# Patient Record
Sex: FEMALE | Race: BLACK OR AFRICAN AMERICAN | URBAN - METROPOLITAN AREA
[De-identification: names, ages, dates, MRNs, and addresses within clinical notes are randomized per-mention and may not be internally consistent; named-entity substitution may affect disease eponyms.]

---

## 2017-02-24 ENCOUNTER — HISTORICAL (OUTPATIENT)
Dept: INTERNAL MEDICINE | Facility: CLINIC | Age: 67
End: 2017-02-24

## 2017-05-12 ENCOUNTER — HISTORICAL (OUTPATIENT)
Dept: INTERNAL MEDICINE | Facility: CLINIC | Age: 67
End: 2017-05-12

## 2017-05-12 LAB
ERYTHROCYTE [SEDIMENTATION RATE] IN BLOOD: 55 MM/HR (ref 0–20)
RHEUMATOID FACT SERPL-ACNC: <10 IU/ML (ref 0–15)
T4 FREE SERPL-MCNC: 0.69 NG/DL (ref 0.6–1.15)
T4 SERPL-MCNC: 6.67 MCG/DL (ref 6.09–12.23)
TSH SERPL-ACNC: 7.97 MIU/L (ref 0.5–5)
URATE SERPL-MCNC: 5.4 MG/DL (ref 2.5–6.5)

## 2017-05-17 ENCOUNTER — HISTORICAL (OUTPATIENT)
Dept: INTERNAL MEDICINE | Facility: CLINIC | Age: 67
End: 2017-05-17

## 2017-05-31 ENCOUNTER — HISTORICAL (OUTPATIENT)
Dept: ADMINISTRATIVE | Facility: HOSPITAL | Age: 67
End: 2017-05-31

## 2017-06-08 ENCOUNTER — HISTORICAL (OUTPATIENT)
Dept: SLEEP MEDICINE | Facility: HOSPITAL | Age: 67
End: 2017-06-08

## 2017-07-19 ENCOUNTER — HISTORICAL (OUTPATIENT)
Dept: SLEEP MEDICINE | Facility: HOSPITAL | Age: 67
End: 2017-07-19

## 2017-07-28 ENCOUNTER — HISTORICAL (OUTPATIENT)
Dept: ADMINISTRATIVE | Facility: HOSPITAL | Age: 67
End: 2017-07-28

## 2017-07-28 LAB
APPEARANCE, UA: CLEAR
BACTERIA #/AREA URNS AUTO: ABNORMAL /[HPF]
BILIRUB UR QL STRIP: NEGATIVE
BUN SERPL-MCNC: 13 MG/DL (ref 7–18)
CALCIUM SERPL-MCNC: 9.6 MG/DL (ref 8.5–10.1)
CHLORIDE SERPL-SCNC: 104 MMOL/L (ref 98–107)
CHOLEST SERPL-MCNC: 185 MG/DL
CHOLEST/HDLC SERPL: 3.8 {RATIO} (ref 0–4.4)
CO2 SERPL-SCNC: 31 MMOL/L (ref 21–32)
COLOR UR: NORMAL
CREAT SERPL-MCNC: 1 MG/DL (ref 0.6–1.3)
CREAT UR-MCNC: 160 MG/DL
EST. AVERAGE GLUCOSE BLD GHB EST-MCNC: 140 MG/DL
GLUCOSE (UA): NORMAL
GLUCOSE SERPL-MCNC: 102 MG/DL (ref 74–106)
HBA1C MFR BLD: 6.5 % (ref 4.2–6.3)
HDLC SERPL-MCNC: 49 MG/DL
HGB UR QL STRIP: NEGATIVE
HYALINE CASTS #/AREA URNS LPF: ABNORMAL /[LPF]
KETONES UR QL STRIP: NEGATIVE
LDLC SERPL CALC-MCNC: 108 MG/DL (ref 0–130)
LEUKOCYTE ESTERASE UR QL STRIP: NEGATIVE
MICROALBUMIN UR-MCNC: 11.6 MG/L (ref 0–19)
MICROALBUMIN/CREAT RATIO PNL UR: 7.2 MCG/MG CR (ref 0–29)
NITRITE UR QL STRIP: NEGATIVE
PH UR STRIP: 7 [PH] (ref 4.5–8)
POTASSIUM SERPL-SCNC: 4.6 MMOL/L (ref 3.5–5.1)
PROT UR QL STRIP: NEGATIVE
RBC #/AREA URNS AUTO: ABNORMAL /[HPF]
SODIUM SERPL-SCNC: 143 MMOL/L (ref 136–145)
SP GR UR STRIP: 1.01 (ref 1–1.03)
SQUAMOUS #/AREA URNS LPF: ABNORMAL /[LPF]
TRIGL SERPL-MCNC: 140 MG/DL
UROBILINOGEN UR STRIP-ACNC: NORMAL
VLDLC SERPL CALC-MCNC: 28 MG/DL
WBC #/AREA URNS AUTO: ABNORMAL /HPF

## 2017-12-27 ENCOUNTER — HISTORICAL (OUTPATIENT)
Dept: NEPHROLOGY | Facility: CLINIC | Age: 67
End: 2017-12-27

## 2017-12-27 LAB
ALBUMIN SERPL-MCNC: 3.4 GM/DL (ref 3.4–5)
ALBUMIN/GLOB SERPL: 1 RATIO (ref 1–2)
ALP SERPL-CCNC: 110 UNIT/L (ref 45–117)
ALT SERPL-CCNC: 19 UNIT/L (ref 12–78)
AST SERPL-CCNC: 17 UNIT/L (ref 15–37)
BILIRUB SERPL-MCNC: 0.2 MG/DL (ref 0.2–1)
BILIRUBIN DIRECT+TOT PNL SERPL-MCNC: <0.1 MG/DL
BILIRUBIN DIRECT+TOT PNL SERPL-MCNC: ABNORMAL MG/DL
BUN SERPL-MCNC: 15 MG/DL (ref 7–18)
CALCIUM SERPL-MCNC: 8.8 MG/DL (ref 8.5–10.1)
CHLORIDE SERPL-SCNC: 106 MMOL/L (ref 98–107)
CHOLEST SERPL-MCNC: 169 MG/DL
CHOLEST/HDLC SERPL: 3.9 {RATIO} (ref 0–4.4)
CO2 SERPL-SCNC: 30 MMOL/L (ref 21–32)
CREAT SERPL-MCNC: 0.8 MG/DL (ref 0.6–1.3)
CREAT UR-MCNC: 90 MG/DL
EST. AVERAGE GLUCOSE BLD GHB EST-MCNC: 157 MG/DL
GLOBULIN SER-MCNC: 4.5 GM/ML (ref 2.3–3.5)
GLUCOSE SERPL-MCNC: 108 MG/DL (ref 74–106)
HBA1C MFR BLD: 7.1 % (ref 4.2–6.3)
HDLC SERPL-MCNC: 43 MG/DL
LDLC SERPL CALC-MCNC: 100 MG/DL (ref 0–130)
MICROALBUMIN UR-MCNC: 5.6 MG/L (ref 0–19)
MICROALBUMIN/CREAT RATIO PNL UR: 6.2 MCG/MG CR (ref 0–29)
POTASSIUM SERPL-SCNC: 4.3 MMOL/L (ref 3.5–5.1)
PROT SERPL-MCNC: 7.9 GM/DL (ref 6.4–8.2)
SODIUM SERPL-SCNC: 143 MMOL/L (ref 136–145)
T4 FREE SERPL-MCNC: 1.26 NG/DL (ref 0.76–1.46)
T4 SERPL-MCNC: 11.5 MCG/DL (ref 4.8–13.9)
TRIGL SERPL-MCNC: 128 MG/DL
TSH SERPL-ACNC: 0.15 MIU/L (ref 0.36–3.74)
VLDLC SERPL CALC-MCNC: 26 MG/DL

## 2018-02-01 ENCOUNTER — HISTORICAL (OUTPATIENT)
Dept: INTERNAL MEDICINE | Facility: CLINIC | Age: 68
End: 2018-02-01

## 2018-04-12 ENCOUNTER — HISTORICAL (OUTPATIENT)
Dept: ADMINISTRATIVE | Facility: HOSPITAL | Age: 68
End: 2018-04-12

## 2018-04-12 LAB
CREAT UR-MCNC: 220 MG/DL
DEPRECATED CALCIDIOL+CALCIFEROL SERPL-MC: 35.02 NG/ML (ref 30–80)
EST. AVERAGE GLUCOSE BLD GHB EST-MCNC: 160 MG/DL
HBA1C MFR BLD: 7.2 % (ref 4.2–6.3)
MICROALBUMIN UR-MCNC: 16.1 MG/L (ref 0–19)
MICROALBUMIN/CREAT RATIO PNL UR: 7.3 MCG/MG CR (ref 0–29)
VIT B12 SERPL-MCNC: 329 PG/ML (ref 193–986)

## 2018-04-19 ENCOUNTER — HISTORICAL (OUTPATIENT)
Dept: ENDOSCOPY | Facility: HOSPITAL | Age: 68
End: 2018-04-19

## 2018-06-08 ENCOUNTER — HISTORICAL (OUTPATIENT)
Dept: INTERNAL MEDICINE | Facility: CLINIC | Age: 68
End: 2018-06-08

## 2018-06-08 LAB
APPEARANCE, UA: CLEAR
BACTERIA #/AREA URNS AUTO: ABNORMAL /[HPF]
BILIRUB UR QL STRIP: NEGATIVE
BUN SERPL-MCNC: 13 MG/DL (ref 7–18)
CALCIUM SERPL-MCNC: 9 MG/DL (ref 8.5–10.1)
CHLORIDE SERPL-SCNC: 105 MMOL/L (ref 98–107)
CHOLEST SERPL-MCNC: 189 MG/DL
CHOLEST/HDLC SERPL: 4 {RATIO} (ref 0–4.4)
CO2 SERPL-SCNC: 30 MMOL/L (ref 21–32)
COLOR UR: YELLOW
CREAT SERPL-MCNC: 0.9 MG/DL (ref 0.6–1.3)
CREAT UR-MCNC: 248 MG/DL
CREAT/UREA NIT SERPL: 14
EST. AVERAGE GLUCOSE BLD GHB EST-MCNC: 157 MG/DL
GLUCOSE (UA): NORMAL
GLUCOSE SERPL-MCNC: 111 MG/DL (ref 74–106)
HBA1C MFR BLD: 7.1 % (ref 4.2–6.3)
HDLC SERPL-MCNC: 47 MG/DL
HGB UR QL STRIP: 0.06 MG/DL
HYALINE CASTS #/AREA URNS LPF: ABNORMAL /[LPF]
KETONES UR QL STRIP: NEGATIVE
LDLC SERPL CALC-MCNC: 118 MG/DL (ref 0–130)
LEUKOCYTE ESTERASE UR QL STRIP: NEGATIVE
MICROALBUMIN UR-MCNC: 25 MG/L (ref 0–19)
MICROALBUMIN/CREAT RATIO PNL UR: 10.1 MCG/MG CR (ref 0–29)
NITRITE UR QL STRIP: NEGATIVE
PH UR STRIP: 6 [PH] (ref 4.5–8)
POTASSIUM SERPL-SCNC: 4.4 MMOL/L (ref 3.5–5.1)
PROT UR QL STRIP: 10 MG/DL
RBC #/AREA URNS AUTO: ABNORMAL /[HPF]
SODIUM SERPL-SCNC: 140 MMOL/L (ref 136–145)
SP GR UR STRIP: 1.02 (ref 1–1.03)
SQUAMOUS #/AREA URNS LPF: ABNORMAL /[LPF]
TRIGL SERPL-MCNC: 122 MG/DL
UROBILINOGEN UR STRIP-ACNC: NORMAL
VLDLC SERPL CALC-MCNC: 24 MG/DL
WBC #/AREA URNS AUTO: ABNORMAL /HPF

## 2018-06-12 ENCOUNTER — HISTORICAL (OUTPATIENT)
Dept: ADMINISTRATIVE | Facility: HOSPITAL | Age: 68
End: 2018-06-12

## 2018-06-12 LAB
T4 FREE SERPL-MCNC: 1.22 NG/DL (ref 0.76–1.46)
T4 SERPL-MCNC: 11.2 MCG/DL (ref 4.8–13.9)
TSH SERPL-ACNC: 0.09 MIU/L (ref 0.36–3.74)
VIT B12 SERPL-MCNC: 293 PG/ML (ref 193–986)

## 2018-08-01 ENCOUNTER — HISTORICAL (OUTPATIENT)
Dept: INTERNAL MEDICINE | Facility: CLINIC | Age: 68
End: 2018-08-01

## 2018-08-01 LAB
ABS NEUT (OLG): 2.16 X10(3)/MCL (ref 2.1–9.2)
BASOPHILS # BLD AUTO: 0.05 X10(3)/MCL
BASOPHILS NFR BLD AUTO: 1 %
BUN SERPL-MCNC: 14 MG/DL (ref 7–18)
CALCIUM SERPL-MCNC: 8.9 MG/DL (ref 8.5–10.1)
CHLORIDE SERPL-SCNC: 107 MMOL/L (ref 98–107)
CO2 SERPL-SCNC: 30 MMOL/L (ref 21–32)
CREAT SERPL-MCNC: 0.9 MG/DL (ref 0.6–1.3)
CREAT UR-MCNC: 76 MG/DL
CREAT/UREA NIT SERPL: 16
EOSINOPHIL # BLD AUTO: 0.22 10*3/UL
EOSINOPHIL NFR BLD AUTO: 4 %
ERYTHROCYTE [DISTWIDTH] IN BLOOD BY AUTOMATED COUNT: 14.6 % (ref 11.5–14.5)
EST. AVERAGE GLUCOSE BLD GHB EST-MCNC: 151 MG/DL
GLUCOSE SERPL-MCNC: 125 MG/DL (ref 74–106)
HBA1C MFR BLD: 6.9 % (ref 4.2–6.3)
HCT VFR BLD AUTO: 36.2 % (ref 35–46)
HGB BLD-MCNC: 11.6 GM/DL (ref 12–16)
IMM GRANULOCYTES # BLD AUTO: 0.01 10*3/UL
IMM GRANULOCYTES NFR BLD AUTO: 0 %
LYMPHOCYTES # BLD AUTO: 2.99 X10(3)/MCL
LYMPHOCYTES NFR BLD AUTO: 50 % (ref 13–40)
MCH RBC QN AUTO: 27.5 PG (ref 26–34)
MCHC RBC AUTO-ENTMCNC: 32 GM/DL (ref 31–37)
MCV RBC AUTO: 85.8 FL (ref 80–100)
MICROALBUMIN UR-MCNC: <5 MG/L (ref 0–19)
MICROALBUMIN/CREAT RATIO PNL UR: <6.6 MCG/MG CR (ref 0–29)
MONOCYTES # BLD AUTO: 0.61 X10(3)/MCL
MONOCYTES NFR BLD AUTO: 10 % (ref 4–12)
NEUTROPHILS # BLD AUTO: 2.16 X10(3)/MCL
NEUTROPHILS NFR BLD AUTO: 36 X10(3)/MCL
PLATELET # BLD AUTO: 271 X10(3)/MCL (ref 130–400)
PMV BLD AUTO: 9 FL (ref 7.4–10.4)
POTASSIUM SERPL-SCNC: 4.2 MMOL/L (ref 3.5–5.1)
RBC # BLD AUTO: 4.22 X10(6)/MCL (ref 4–5.2)
SODIUM SERPL-SCNC: 142 MMOL/L (ref 136–145)
WBC # SPEC AUTO: 6 X10(3)/MCL (ref 4.5–11)

## 2018-08-03 ENCOUNTER — HISTORICAL (OUTPATIENT)
Dept: ADMINISTRATIVE | Facility: HOSPITAL | Age: 68
End: 2018-08-03

## 2018-08-22 ENCOUNTER — HISTORICAL (OUTPATIENT)
Dept: ADMINISTRATIVE | Facility: HOSPITAL | Age: 68
End: 2018-08-22

## 2018-10-16 ENCOUNTER — HISTORICAL (OUTPATIENT)
Dept: ADMINISTRATIVE | Facility: HOSPITAL | Age: 68
End: 2018-10-16

## 2018-11-05 ENCOUNTER — HISTORICAL (OUTPATIENT)
Dept: INTERNAL MEDICINE | Facility: CLINIC | Age: 68
End: 2018-11-05

## 2018-11-05 LAB
ALBUMIN SERPL-MCNC: 3.5 GM/DL (ref 3.4–5)
ALBUMIN/GLOB SERPL: 1 RATIO (ref 1–2)
ALP SERPL-CCNC: 97 UNIT/L (ref 45–117)
ALT SERPL-CCNC: 27 UNIT/L (ref 12–78)
AST SERPL-CCNC: 13 UNIT/L (ref 15–37)
BILIRUB SERPL-MCNC: 0.2 MG/DL (ref 0.2–1)
BILIRUBIN DIRECT+TOT PNL SERPL-MCNC: <0.1 MG/DL
BILIRUBIN DIRECT+TOT PNL SERPL-MCNC: ABNORMAL MG/DL
BUN SERPL-MCNC: 17 MG/DL (ref 7–18)
CALCIUM SERPL-MCNC: 9.2 MG/DL (ref 8.5–10.1)
CHLORIDE SERPL-SCNC: 104 MMOL/L (ref 98–107)
CHOLEST SERPL-MCNC: 181 MG/DL
CHOLEST/HDLC SERPL: 3.9 {RATIO} (ref 0–4.4)
CO2 SERPL-SCNC: 33 MMOL/L (ref 21–32)
CREAT SERPL-MCNC: 0.9 MG/DL (ref 0.6–1.3)
CREAT UR-MCNC: 114 MG/DL
EST. AVERAGE GLUCOSE BLD GHB EST-MCNC: 148 MG/DL
GLOBULIN SER-MCNC: 4.7 GM/ML (ref 2.3–3.5)
GLUCOSE SERPL-MCNC: 80 MG/DL (ref 74–106)
HBA1C MFR BLD: 6.8 % (ref 4.2–6.3)
HDLC SERPL-MCNC: 46 MG/DL
LDLC SERPL CALC-MCNC: 115 MG/DL (ref 0–130)
MICROALBUMIN UR-MCNC: 7.6 MG/L (ref 0–19)
MICROALBUMIN/CREAT RATIO PNL UR: 6.7 MCG/MG CR (ref 0–29)
POTASSIUM SERPL-SCNC: 4.1 MMOL/L (ref 3.5–5.1)
PROT SERPL-MCNC: 8.2 GM/DL (ref 6.4–8.2)
SODIUM SERPL-SCNC: 142 MMOL/L (ref 136–145)
T4 FREE SERPL-MCNC: 1.26 NG/DL (ref 0.76–1.46)
T4 SERPL-MCNC: 12.5 MCG/DL (ref 4.8–13.9)
TRIGL SERPL-MCNC: 100 MG/DL
TSH SERPL-ACNC: 0.04 MIU/L (ref 0.36–3.74)
VLDLC SERPL CALC-MCNC: 20 MG/DL

## 2018-12-19 ENCOUNTER — HISTORICAL (OUTPATIENT)
Dept: RADIOLOGY | Facility: HOSPITAL | Age: 68
End: 2018-12-19

## 2019-01-14 ENCOUNTER — HISTORICAL (OUTPATIENT)
Dept: CARDIOLOGY | Facility: HOSPITAL | Age: 69
End: 2019-01-14

## 2019-01-17 ENCOUNTER — HISTORICAL (OUTPATIENT)
Dept: INTERNAL MEDICINE | Facility: CLINIC | Age: 69
End: 2019-01-17

## 2019-01-17 LAB
BUN SERPL-MCNC: 12 MG/DL (ref 7–18)
CALCIUM SERPL-MCNC: 9.5 MG/DL (ref 8.5–10.1)
CHLORIDE SERPL-SCNC: 104 MMOL/L (ref 98–107)
CO2 SERPL-SCNC: 33 MMOL/L (ref 21–32)
CREAT SERPL-MCNC: 1 MG/DL (ref 0.6–1.3)
CREAT UR-MCNC: 69 MG/DL
CREAT/UREA NIT SERPL: 12
EST. AVERAGE GLUCOSE BLD GHB EST-MCNC: 146 MG/DL
GLUCOSE SERPL-MCNC: 112 MG/DL (ref 74–106)
HBA1C MFR BLD: 6.7 % (ref 4.2–6.3)
MICROALBUMIN UR-MCNC: 6.5 MG/L (ref 0–19)
MICROALBUMIN/CREAT RATIO PNL UR: 9.4 MCG/MG CR (ref 0–29)
POTASSIUM SERPL-SCNC: 4.4 MMOL/L (ref 3.5–5.1)
SODIUM SERPL-SCNC: 139 MMOL/L (ref 136–145)

## 2019-03-08 ENCOUNTER — HISTORICAL (OUTPATIENT)
Dept: ADMINISTRATIVE | Facility: HOSPITAL | Age: 69
End: 2019-03-08

## 2019-07-18 ENCOUNTER — HISTORICAL (OUTPATIENT)
Dept: INTERNAL MEDICINE | Facility: CLINIC | Age: 69
End: 2019-07-18

## 2019-07-18 LAB
BUN SERPL-MCNC: 13 MG/DL (ref 7–18)
CALCIUM SERPL-MCNC: 9.6 MG/DL (ref 8.5–10.1)
CHLORIDE SERPL-SCNC: 105 MMOL/L (ref 98–107)
CHOLEST SERPL-MCNC: 222 MG/DL
CHOLEST/HDLC SERPL: 4.1 {RATIO} (ref 0–4.4)
CO2 SERPL-SCNC: 30 MMOL/L (ref 21–32)
CREAT SERPL-MCNC: 1 MG/DL (ref 0.6–1.3)
CREAT UR-MCNC: 355 MG/DL
CREAT/UREA NIT SERPL: 13
EST. AVERAGE GLUCOSE BLD GHB EST-MCNC: 157 MG/DL
GLUCOSE SERPL-MCNC: 128 MG/DL (ref 74–106)
HBA1C MFR BLD: 7.1 % (ref 4.2–6.3)
HDLC SERPL-MCNC: 54 MG/DL
LDLC SERPL CALC-MCNC: 143 MG/DL (ref 0–130)
MICROALBUMIN UR-MCNC: 31.9 MG/L (ref 0–19)
MICROALBUMIN/CREAT RATIO PNL UR: 9 MCG/MG CR (ref 0–29)
POTASSIUM SERPL-SCNC: 3.8 MMOL/L (ref 3.5–5.1)
SODIUM SERPL-SCNC: 140 MMOL/L (ref 136–145)
TRIGL SERPL-MCNC: 127 MG/DL
VLDLC SERPL CALC-MCNC: 25 MG/DL

## 2019-07-23 ENCOUNTER — HISTORICAL (OUTPATIENT)
Dept: ADMINISTRATIVE | Facility: HOSPITAL | Age: 69
End: 2019-07-23

## 2019-07-23 LAB
T4 FREE SERPL-MCNC: 1.02 NG/DL (ref 0.76–1.46)
TSH SERPL-ACNC: 4 MIU/L (ref 0.36–3.74)

## 2019-08-07 ENCOUNTER — HISTORICAL (OUTPATIENT)
Dept: INTERNAL MEDICINE | Facility: CLINIC | Age: 69
End: 2019-08-07

## 2019-12-18 ENCOUNTER — HISTORICAL (OUTPATIENT)
Dept: ADMINISTRATIVE | Facility: HOSPITAL | Age: 69
End: 2019-12-18

## 2019-12-21 LAB — FINAL CULTURE: NORMAL

## 2020-01-22 ENCOUNTER — HISTORICAL (OUTPATIENT)
Dept: INTERNAL MEDICINE | Facility: CLINIC | Age: 70
End: 2020-01-22

## 2020-01-22 LAB
ABS NEUT (OLG): 2.8 X10(3)/MCL (ref 2.1–9.2)
ALBUMIN SERPL-MCNC: 3.9 GM/DL (ref 3.4–5)
ALBUMIN/GLOB SERPL: 0.8 RATIO (ref 1.1–2)
ALP SERPL-CCNC: 98 UNIT/L (ref 45–117)
ALT SERPL-CCNC: 20 UNIT/L (ref 12–78)
AST SERPL-CCNC: 15 UNIT/L (ref 15–37)
BASOPHILS # BLD AUTO: 0.1 X10(3)/MCL (ref 0–0.2)
BASOPHILS NFR BLD AUTO: 1 %
BILIRUB SERPL-MCNC: 0.3 MG/DL (ref 0.2–1)
BILIRUBIN DIRECT+TOT PNL SERPL-MCNC: 0.1 MG/DL (ref 0–0.2)
BILIRUBIN DIRECT+TOT PNL SERPL-MCNC: 0.2 MG/DL
BUN SERPL-MCNC: 14 MG/DL (ref 7–18)
CALCIUM SERPL-MCNC: 9.3 MG/DL (ref 8.5–10.1)
CHLORIDE SERPL-SCNC: 105 MMOL/L (ref 98–107)
CHOLEST SERPL-MCNC: 203 MG/DL
CHOLEST/HDLC SERPL: 4.1 {RATIO} (ref 0–4.4)
CO2 SERPL-SCNC: 30 MMOL/L (ref 21–32)
CREAT SERPL-MCNC: 0.8 MG/DL (ref 0.6–1.3)
EOSINOPHIL # BLD AUTO: 0.1 X10(3)/MCL (ref 0–0.9)
EOSINOPHIL NFR BLD AUTO: 2 %
ERYTHROCYTE [DISTWIDTH] IN BLOOD BY AUTOMATED COUNT: 14 % (ref 11.5–14.5)
EST. AVERAGE GLUCOSE BLD GHB EST-MCNC: 177 MG/DL
GLOBULIN SER-MCNC: 4.8 GM/ML (ref 2.3–3.5)
GLUCOSE SERPL-MCNC: 90 MG/DL (ref 74–106)
HBA1C MFR BLD: 7.8 % (ref 4.2–6.3)
HCT VFR BLD AUTO: 41.4 % (ref 35–46)
HDLC SERPL-MCNC: 50 MG/DL (ref 40–59)
HGB BLD-MCNC: 13 GM/DL (ref 12–16)
IMM GRANULOCYTES # BLD AUTO: 0.01 10*3/UL
IMM GRANULOCYTES NFR BLD AUTO: 0 %
LDLC SERPL CALC-MCNC: 126 MG/DL
LYMPHOCYTES # BLD AUTO: 2 X10(3)/MCL (ref 0.6–4.6)
LYMPHOCYTES NFR BLD AUTO: 37 %
MCH RBC QN AUTO: 27 PG (ref 26–34)
MCHC RBC AUTO-ENTMCNC: 31.4 GM/DL (ref 31–37)
MCV RBC AUTO: 85.9 FL (ref 80–100)
MONOCYTES # BLD AUTO: 0.5 X10(3)/MCL (ref 0.1–1.3)
MONOCYTES NFR BLD AUTO: 10 %
NEUTROPHILS # BLD AUTO: 2.8 X10(3)/MCL (ref 2.1–9.2)
NEUTROPHILS NFR BLD AUTO: 50 %
PLATELET # BLD AUTO: 248 X10(3)/MCL (ref 130–400)
PMV BLD AUTO: 9.2 FL (ref 7.4–10.4)
POTASSIUM SERPL-SCNC: 3.8 MMOL/L (ref 3.5–5.1)
PROT SERPL-MCNC: 8.7 GM/DL (ref 6.4–8.2)
RBC # BLD AUTO: 4.82 X10(6)/MCL (ref 4–5.2)
SODIUM SERPL-SCNC: 138 MMOL/L (ref 136–145)
T4 FREE SERPL-MCNC: 1.18 NG/DL (ref 0.76–1.46)
TRIGL SERPL-MCNC: 136 MG/DL
TSH SERPL-ACNC: 0.74 MIU/L (ref 0.36–3.74)
VLDLC SERPL CALC-MCNC: 27 MG/DL
WBC # SPEC AUTO: 5.6 X10(3)/MCL (ref 4.5–11)

## 2020-02-21 ENCOUNTER — HISTORICAL (OUTPATIENT)
Dept: INTERNAL MEDICINE | Facility: CLINIC | Age: 70
End: 2020-02-21

## 2020-02-24 ENCOUNTER — HISTORICAL (OUTPATIENT)
Dept: RESPIRATORY THERAPY | Facility: HOSPITAL | Age: 70
End: 2020-02-24

## 2020-10-19 ENCOUNTER — HISTORICAL (OUTPATIENT)
Dept: INTERNAL MEDICINE | Facility: CLINIC | Age: 70
End: 2020-10-19

## 2020-10-19 LAB
ABS NEUT (OLG): 2.28 X10(3)/MCL (ref 2.1–9.2)
ALBUMIN SERPL-MCNC: 3.9 GM/DL (ref 3.4–5)
ALBUMIN/GLOB SERPL: 0.8 RATIO (ref 1.1–2)
ALP SERPL-CCNC: 119 UNIT/L (ref 45–117)
ALT SERPL-CCNC: 20 UNIT/L (ref 12–78)
AST SERPL-CCNC: 19 UNIT/L (ref 15–37)
BASOPHILS # BLD AUTO: 0.1 X10(3)/MCL (ref 0–0.2)
BASOPHILS NFR BLD AUTO: 1 %
BILIRUB SERPL-MCNC: 0.3 MG/DL (ref 0.2–1)
BILIRUBIN DIRECT+TOT PNL SERPL-MCNC: 0.1 MG/DL (ref 0–0.2)
BILIRUBIN DIRECT+TOT PNL SERPL-MCNC: 0.2 MG/DL
BUN SERPL-MCNC: 14 MG/DL (ref 7–18)
CALCIUM SERPL-MCNC: 9.8 MG/DL (ref 8.5–10.1)
CHLORIDE SERPL-SCNC: 103 MMOL/L (ref 98–107)
CHOLEST SERPL-MCNC: 204 MG/DL
CHOLEST/HDLC SERPL: 4 {RATIO} (ref 0–4.4)
CO2 SERPL-SCNC: 30 MMOL/L (ref 21–32)
CREAT SERPL-MCNC: 1 MG/DL (ref 0.6–1.3)
CREAT UR-MCNC: 118 MG/DL
DEPRECATED CALCIDIOL+CALCIFEROL SERPL-MC: 57.4 NG/ML (ref 30–80)
EOSINOPHIL # BLD AUTO: 0.2 X10(3)/MCL (ref 0–0.9)
EOSINOPHIL NFR BLD AUTO: 3 %
ERYTHROCYTE [DISTWIDTH] IN BLOOD BY AUTOMATED COUNT: 14.2 % (ref 11.5–14.5)
EST. AVERAGE GLUCOSE BLD GHB EST-MCNC: 183 MG/DL
GLOBULIN SER-MCNC: 5.2 GM/ML (ref 2.3–3.5)
GLUCOSE SERPL-MCNC: 122 MG/DL (ref 74–106)
HBA1C MFR BLD: 8 % (ref 4.2–6.3)
HCT VFR BLD AUTO: 40.3 % (ref 35–46)
HDLC SERPL-MCNC: 51 MG/DL (ref 40–59)
HGB BLD-MCNC: 12.9 GM/DL (ref 12–16)
IMM GRANULOCYTES # BLD AUTO: 0.01 10*3/UL
IMM GRANULOCYTES NFR BLD AUTO: 0 %
LDLC SERPL CALC-MCNC: 125 MG/DL
LYMPHOCYTES # BLD AUTO: 2.5 X10(3)/MCL (ref 0.6–4.6)
LYMPHOCYTES NFR BLD AUTO: 45 %
MCH RBC QN AUTO: 26.9 PG (ref 26–34)
MCHC RBC AUTO-ENTMCNC: 32 GM/DL (ref 31–37)
MCV RBC AUTO: 84 FL (ref 80–100)
MICROALBUMIN UR-MCNC: 8.5 MG/L (ref 0–19)
MICROALBUMIN/CREAT RATIO PNL UR: 7.2 MCG/MG CR (ref 0–29)
MONOCYTES # BLD AUTO: 0.5 X10(3)/MCL (ref 0.1–1.3)
MONOCYTES NFR BLD AUTO: 10 %
NEUTROPHILS # BLD AUTO: 2.28 X10(3)/MCL (ref 2.1–9.2)
NEUTROPHILS NFR BLD AUTO: 41 %
PLATELET # BLD AUTO: 310 X10(3)/MCL (ref 130–400)
PMV BLD AUTO: 9.2 FL (ref 7.4–10.4)
POTASSIUM SERPL-SCNC: 4.2 MMOL/L (ref 3.5–5.1)
PROT SERPL-MCNC: 9.1 GM/DL (ref 6.4–8.2)
RBC # BLD AUTO: 4.8 X10(6)/MCL (ref 4–5.2)
SODIUM SERPL-SCNC: 138 MMOL/L (ref 136–145)
T4 FREE SERPL-MCNC: 1.08 NG/DL (ref 0.76–1.46)
TRIGL SERPL-MCNC: 142 MG/DL
TSH SERPL-ACNC: 2.95 MIU/L (ref 0.36–3.74)
VLDLC SERPL CALC-MCNC: 28 MG/DL
WBC # SPEC AUTO: 5.5 X10(3)/MCL (ref 4.5–11)

## 2022-04-11 ENCOUNTER — HISTORICAL (OUTPATIENT)
Dept: ADMINISTRATIVE | Facility: HOSPITAL | Age: 72
End: 2022-04-11

## 2022-04-24 VITALS
HEIGHT: 63 IN | BODY MASS INDEX: 40.23 KG/M2 | DIASTOLIC BLOOD PRESSURE: 84 MMHG | SYSTOLIC BLOOD PRESSURE: 126 MMHG | WEIGHT: 227.06 LBS

## 2022-04-30 NOTE — OP NOTE
DATE OF SURGERY:    04/19/2018    SURGEON:  Austin Nunez MD    attending physician:  Dr. Austin Nunez MD    PROCEDURE PERFORMED:  Esophagogastroduodenoscopy.    PREOPERATIVE DIAGNOSIS:  Chronic nausea.    POSTOPERATIVE DIAGNOSIS:  Gastroduodenitis.    ESTIMATED BLOOD LOSS:  Less than 2 cc.    INDICATIONS:  The patient is a 68-year-old female, who has had several months of intermittent nausea, occasional vomiting.  No hematemesis or melena.  Denies pyrosis or dysphagia.    PROCEDURE IN DETAIL:  After discussing the procedure in detail and reviewing risks and benefits, written informed consents were obtained.  She was sedated by Anesthesia and laid in the left lateral decubitus position.  A bite block was placed and the scope was introduced into the hypopharynx.  The cricopharyngeus was intubated, and the scope was advanced down the lumen of the esophagus and into the stomach.  It was retroflexed to examine the fundus and cardia and then straightened.  The pylorus was intubated and the scope was advanced down the descending duodenum.  It was withdrawn back into the antrum.  Two antral biopsies were obtained and placed in a single Hp-fast test.  Random biopsies were also obtained of the duodenal bulb, antrum, and body.  The scope was then slowly withdrawn out of the stomach and esophagus and the procedure was terminated.  She tolerated this well.  There were no complications.    FINDINGS:    1. Esophagus:  Widely patent.  Squamocolumnar junction was appropriately located.  No esophageal biopsies were obtained.    2. Stomach:  There was diffuse hyperemia throughout the stomach with scattered erosions.  No nodularity.  No bart ulcerations.    3. Duodenum:  The bulb had mild hyperemia; otherwise, the duodenal mucosa was within normal limits beyond the superior duodenal angle.    IMPRESSION:  Gastroduodenitis.      PLAN:  Followup with PCP in 2-3 weeks.  Her Hp-fast and biopsies will need follow up.  We will  discussed findings and post procedure precautions.  Colonoscopy was also performed, this is in a separate dictation.        ______________________________  MD SAHRA Ansari/JACK  DD:  04/19/2018  Time:  01:19PM  DT:  04/19/2018  Time:  01:30PM  Job #:  [id number]

## 2022-05-05 NOTE — HISTORICAL [facility] CERNER
This is a historical note converted from [employer]. Formatting and pictures may have been removed.  Please reference [employer] for original formatting and attached multimedia. Chief Complaint  Pain in right posterior back, states kidney stones and mediastinal pain and SOB with wheezing out of inhaler.  History of Present Illness  69-year-old female here today for follow-up.? Diabetes A1c is 6.7 previous A1c was 6.8 and at goal.? Patient is due for retinal exam?and foot exam today.? Hypertension at goal. Asthma pt is out of inhaler refills and states she has wheezing and SOB. She is?not on Singular. ?Pt reports flank pain due to kidney stone Xray 08/22/2018 There is a moderate amount of stool throughout the colon. Small bowel is not dilated. No nephrolithiasis is seen. Pt reports flank pain on her right side.  Review of Systems  All Systems Negative Except: See HPI  Physical Exam  Vitals & Measurements  T:?36.7? ?C (Oral)? HR:?86(Peripheral)? RR:?16? BP:?132/88?  HT:?160.0?cm? WT:?99.7?kg? BMI:?38.95?  General:? Alert and oriented, No acute distress, General health good  Eye:? Pupils are equal, round and reactive to light, Normal conjunctiva.?  HENT:? Normocephalic, Normal hearing, Oral mucosa is moist, No pharyngeal erythema.?  Neck:? Supple, Non-tender, No carotid bruit, No jugular venous distention, No lymphadenopathy, No thyromegaly.?  Respiratory:? Lungs are clear to auscultation, Respirations are non-labored, Breath sounds are equal, Symmetrical chest wall expansion.?  Cardiovascular:? Normal rate, Regular rhythm, No murmur, Good pulses equal in all extremities, Normal peripheral perfusion, No edema.?  Gastrointestinal:? Soft, Non-tender, Non-distended, Normal bowel sounds, No organomegaly.?  Musculoskeletal: Normal range of motion, Normal strength, No tenderness, No deformity, Normal gait.?  Integumentary:? Warm, Dry.?  Neurologic:? Alert, Oriented.?  Psychiatric:? Cooperative, Appropriate mood &  affect.?  ?  ?  ?  Assessment/Plan  Asthma?J45.909  Avoid/limit triggers such as pollen, dust, molds and animal dander  Avoid smoke, strong chemicals, and strong cleaning products and strong perfumes or colognes  Use rescue inhaler when needed, use nebulizer for wheezing or URI  Report upper respiratory infection in early stages and seek treatment, Treat sinusitis  Continue Singular?  Ordered:  albuterol, 2 puff(s), INH, QID, # 2 EA, 5 Refill(s), Pharmacy: [id number]  cetirizine, 10 mg = 1 tab(s), Oral, Daily, # 30 tab(s), 5 Refill(s), Pharmacy: [id number]  montelukast, 10 mg = 1 tab(s), Oral, Daily, # 30 tab(s), 6 Refill(s), Pharmacy: [id number]  ?  Diabetes?E13.9  A1C? 6.7 previous 6.8  Continue medications  Follow ADA diet, avoid soda, simple sweets, and limit rice, breads and starches  Maintain healthy weight, exercise 4-5 times a week for 30?minutes  Foot exam today  Eye exam today  RTC?4?months? w/ labs  Ordered:  Internal Referral to [facility], EYE exam DM, *Est. 03/08/19 3:00:00 CST, Future Visit?, Diabetes  ?  HTN - Hypertension?I10  At goal  Low sodium diet (Dash Diet)  Continue Medications as prescribed  Monitor Blood daily, report any consistent numbers greater than 140/90  Maintain healthy weight  ?  Insomnia?G47.00  Limit daytime naps to 30 minutes  Establish a regular relaxing bedtime routine  Avoid Caffeine after at 2pm?  Avoid use of blue light devices at bedtime, limit TV before bedtime  Exercise to promote good?quality of sleep?  Avoid foods late that can disrupt sleep  Avoid Nicotine before bedtime  Amitriptyline ?at?night  Ordered:  amitriptyline, 50 mg = 1 tab(s), Oral, Once a day (at bedtime), # 30 tab(s), 5 Refill(s), Pharmacy: [id number]  ?  Kidney stone?N20.0  X-ray 08/22/2018 No kidney stone seen  X-ray today  Drink 1/2 body weight in water daily  Ordered:  XR Abdomen Flat and Erect, Routine, *Est. 03/08/19 3:00:00 CST, Other  (please specify), kidney stone, None, Ambulatory, Rad Type, Order for future visit, Kidney stone, Not Scheduled, *Est. 03/08/19 3:00:00 CST  ?  Need for tetanus booster?Z23  ?Tetanus shot today  ?   Problem List/Past Medical History  Ongoing  HTN - Hypertension  Hypothyroidism  Obesity  Historical  Diabetes  Kidney stone  Procedure/Surgical History  Biopsy Gastrointestinal (04/19/2018)  Colonoscopy (04/19/2018)  Esophagogastroduodenoscopy (04/19/2018)  TOTAL THYROIDECTOMY (2014)  HYSTERECTOMY (1991)  UMBILICAL HERNIA REPAIR (1986)  CSECTION (1977)  CSECTION (1971)   Medications  APAP/butalbital/caffeine 325 mg-50 mg-40 mg oral tablet, 1 tab(s), Oral, q4hr, PRN  baclofen 10 mg oral tablet, 5 mg= 0.5 tab(s), Oral, q8hr  cetirizine 10 mg oral tablet, See Instructions, 5 refills  KETOCONAZOLE 2 % CREA  levothyroxine 100 mcg (0.1 mg) oral tablet, 100 mcg= 1 tab(s), Oral, Daily, 2 refills  LEVOTHYROXINE SODIUM 112 MCG TABS, 112 mcg= 1 tab(s), Oral, Daily  magnesium aspartate 1230 mg (122 mg magnesium) oral powder for reconstitution, 1 packet(s), Oral, Daily  METRONIDAZOLE 500 MG TABS, 500 mg= 1 tab(s), Oral, BID  Protonix 40 mg ORAL enteric coated tablet, 40 mg= 1 tab(s), Oral, Daily, 1 refills  Terocin topical lotion, 1 sodnra, TOP, BID, PRN  VENTOLIN  MCG/ACT AERS  Vitamin D3  Allergies  Accupril  Bactrim  Social History  Alcohol - Denies Alcohol Use, 01/28/2016  Current, 1-2 times per year, 08/12/2018  Never, 01/28/2016  Employment/School  Unemployed, Work/School description: DISABLE., 01/28/2016  Exercise  Home/Environment  Lives with Alone. Feels unsafe at home: No. Safe place to go: Yes. Family/Friends available for support: Yes., 01/28/2016  Nutrition/Health  Type of diet: nausea when thinking of food for months.Just nibbles throughtout the day.. Regular, Wants to lose weight: Yes. Eating disorders: craving sour foods and chocolate for months. . Sleeping concerns: No. Feels highly stressed: No.,  2016  Other  Sexual  Substance Abuse - Denies Substance Abuse, 2016  Current, Marijuana, 1-2 times per month, 2018  Tobacco - Denies Tobacco Use, 2016  Never smoker, N/A, 2018  Never smoker, N/A, 2018  Never smoker, N/A, Household tobacco concerns: No., 2018  Family History  Cardiac arrest.: Mother.  Congestive heart disease.: Father.  : Mother and Father.  Heart disease: Father.  Hyperthyroidism.: Negative: Sister.  Hypothyroidism.: Negative: Sister.  Health Maintenance  Health Maintenance  ???Pending?(in the next year)  ??? ??OverDue  ??? ? ? ?Diabetes Maintenance-Eye Exam due??18??and every 1??year(s)  ??? ? ? ?Alcohol Misuse Screening due??18??and every 1??year(s)  ??? ? ? ?Smoking Cessation due??10/24/18??and every 1??year(s)  ??? ? ? ?Diabetes Maintenance-Foot Exam due??01/15/19??and every 1??year(s)  ??? ? ? ?Diabetes Maintenance-Medication Prescribed due??01/15/19??and every 1??year(s)  ??? ??Due?  ??? ? ? ?ADL Screening due??19??and every 1??year(s)  ??? ? ? ?Breast Cancer Screening ([facility]) due??19??and every?  ??? ? ? ?Cognitive Screening due??19??and every 1??year(s)  ??? ? ? ?Coronary Artery Disease Maintenance-Antiplatelet Agent Prescribed due??19??and every?  ??? ? ? ?Coronary Artery Disease Maintenance-Lipid Lowering Therapy due??19??and every?  ??? ? ? ?Functional Assessment due??19??and every 1??year(s)  ??? ? ? ?Hypertension Management-Education due??19??and every 1??year(s)  ??? ? ? ?Pneumococcal Vaccine due??19??Variable frequency  ??? ? ? ?Tetanus Vaccine due??19??and every 10??year(s)  ??? ? ? ?Zoster Vaccine due??19??and every 100??year(s)  ??? ??Due In Future?  ??? ? ? ?Advance Directive not due until??19??and every 1??year(s)  ??? ? ? ?Diabetes Maintenance-Fasting Lipid Profile not due until??19??and every 1??year(s)  ??? ? ? ?Hypertension  Management-Blood Pressure not due until??11/07/19??and every 1??year(s)  ??? ? ? ?Geriatric Depression Screening not due until??11/07/19??and every 1??year(s)  ??? ? ? ?Diabetes Maintenance-Urine Dipstick not due until??12/14/19??and every 1??year(s)  ??? ? ? ?Aspirin Therapy for CVD Prevention not due until??12/14/19??and every 1??year(s)  ??? ? ? ?Obesity Screening not due until??12/27/19??and every 1??year(s)  ??? ? ? ?Fall Risk Assessment not due until??12/27/19??and every 1??year(s)  ??? ? ? ?Diabetes Maintenance-HgbA1c not due until??01/17/20??and every 1??year(s)  ??? ? ? ?Hypertension Management-BMP not due until??01/17/20??and every 1??year(s)  ??? ? ? ?Diabetes Maintenance-Microalbumin not due until??01/17/20??and every 1??year(s)  ??? ? ? ?Diabetes Maintenance-Serum Creatinine not due until??01/17/20??and every 1??year(s)  ??? ? ? ?Bone Density Screening not due until??02/01/20??and every 2??year(s)  ???Satisfied?(in the past 1 year)  ??? ??Satisfied?  ??? ? ? ?Advance Directive on??08/22/18.??Satisfied by Christine Herbert LPN  ??? ? ? ?Aspirin Therapy for CVD Prevention on??12/14/18.??Satisfied by Elsy Quevedo RN  ??? ? ? ?Blood Pressure Screening on??12/27/18.??Satisfied by Padmini Lopes RN  ??? ? ? ?Body Mass Index Check on??12/27/18.??Satisfied by Lianne Bauman RN  ??? ? ? ?Colorectal Screening on??04/19/18.  ??? ? ? ?Colorectal Screening ([facility]) on??04/19/18.  ??? ? ? ?Coronary Artery Disease Maintenance-Antiplatelet Agent Prescribed on??12/14/18.??Satisfied by Jameson Spears MD  ??? ? ? ?Depression Screening on??11/07/18.??Satisfied by Gini Scott LPN.  ??? ? ? ?Diabetes Maintenance-HgbA1c on??01/17/19.??Satisfied by Yuli Ceron  ??? ? ? ?Diabetes Maintenance-Serum Creatinine on??01/17/19.??Satisfied by Araceli Jaimes  ??? ? ? ?Diabetes Maintenance-Microalbumin on??01/17/19.??Satisfied by Araceli Jaimes  ??? ? ? ?Diabetes Maintenance-Urine Dipstick  on??12/14/18.??Satisfied by Jess Madden  ??? ? ? ?Diabetes Maintenance-Fasting Lipid Profile on??11/05/18.??Satisfied by Yuli Ceron  ??? ? ? ?Diabetes Screening on??01/17/19.??Satisfied by Araceli Jaimes  ??? ? ? ?Fall Risk Assessment on??12/27/18.??Satisfied by Padmini Lopes RN  ??? ? ? ?Geriatric Depression Screening on??11/07/18.??Satisfied by Gini Scott LPN  ??? ? ? ?Hypertension Management-BMP on??01/17/19.??Satisfied by Araceli Jaimes  ??? ? ? ?Hypertension Management-Blood Pressure on??12/27/18.??Satisfied by Padmini Lopes RN  ??? ? ? ?Influenza Vaccine on??11/07/18.??Satisfied by Gini Scott LPN  ??? ? ? ?Lipid Screening on??11/05/18.??Satisfied by Yuli Ceron  ??? ? ? ?Obesity Screening on??12/27/18.??Satisfied by Lianne Bauman RN  ?  ?  Diagnostic Results  (08/22/2018 11:34 CDT XR Abdomen KUB 1 View)  ?? ? ??  * Final Report *  ?  Reason For Exam  flank pain hx kidney stone;Other (please specify)  ?  Radiology Report  Abdomen one view  ?  Clinical history is flank pain  ?  This is compared to a previous study from 8/12/2018  ?  There is a moderate amount of stool throughout the colon. Small bowel  is not dilated. No nephrolithiasis is seen.  ?  IMPRESSION: Study showing a moderate amount of stool throughout the  colon.  ?  ?  Signature Line  Electronically Signed By: Kulwinder Mendez MD  Date/Time Signed: 08/22/2018 12:12  ?  ?  This document has an image  ?  Result type:???????  XR Abdomen KUB 1 View  Result date:???????  August 22, 2018 11:34 CDT  Result status:???????  Auth (Verified)  Result title:???????  XR Abdomen KUB 1 View  Performed by:???????  Kulwinder Mendez MD on August 22, 2018 12:11 CDT  Verified by:???????  Kulwinder Mendez MD on August 22, 2018 12:12 CDT  Encounter info:???????  [id number], [facility], Clinic Visit, 8/22/2018 - 8/22/2018  ? [1]     [1]?XR Abdomen KUB 1 View; Kulwinder Mendez MD 08/22/2018 11:34 CDT

## 2022-05-05 NOTE — HISTORICAL [facility] CERNER
This is a historical note converted from [employer]. Formatting and pictures may have been removed.  Please reference [employer] for original formatting and attached multimedia. Chief Complaint  ED F/U abdominal pain  History of Present Illness  68 year old AAF here today as ED F/U for gastritis and upper GI pain with nausea. Pt was recently treated for H pylori. She remains with upper GI pain and nausea. EGD showed gastritis and Duodenitis. Pt reports her symptoms have never improved. She was seen in the ED and started on Zantac, she stopped Protonix. Pt also reports flank pain, She has hx of kidney stones.  Review of Systems  All Systems Negative Except: See HPI  Physical Exam  Vitals & Measurements  T:?36.8? ?C (Oral)? HR:?73(Peripheral)? RR:?18? BP:?130/81?  HT:?98.4?cm? HT:?98.4?cm? WT:?161?kg? WT:?161?kg? BMI:?166.28?  General:? Alert and oriented, No acute distress, General health good  Eye:? Pupils are equal, round and reactive to light, Normal conjunctiva.?  HENT:? Normocephalic, Normal hearing, Oral mucosa is moist, No pharyngeal erythema.?  Neck:? Supple, Non-tender, No carotid bruit, No jugular venous distention, No lymphadenopathy, No thyromegaly.?  Respiratory:? Lungs are clear to auscultation, Respirations are non-labored, Breath sounds are equal, Symmetrical chest wall expansion.?  Cardiovascular:? Normal rate, Regular rhythm, No murmur, Good pulses equal in all extremities, Normal peripheral perfusion, No edema.?  Gastrointestinal:? Soft, Non-tender, Non-distended, Normal bowel sounds, No organomegaly.?  Musculoskeletal: Normal range of motion, Normal strength, No tenderness, No deformity, Normal gait.?  Integumentary:? Warm, Dry.?  Neurologic:? Alert, Oriented.?  Psychiatric:? Cooperative, Appropriate mood & affect.?  ?  ?  ?  Assessment/Plan  Gastritis and duodenitis  Continue Zantac and Protonix  Avoid greasy, fried and high acidic foods  RTC 1 week with food diary  Ordered:  Clinic Follow up, *Est.  08/29/18 3:00:00 CDT, 1 week with food log, Order for future visit, Gastritis and duodenitis, [facility]  ?  History of kidney stones  KUB xray today  Ordered:  XR Abdomen KUB 1 View, Routine, *Est. 08/22/18 3:00:00 CDT, Other (please specify), flank pain hx kidney stone, None, Ambulatory, Rad Type, Order for future visit, Right flank pain  History of kidney stones, Not Scheduled, *Est. 08/22/18 3:00:00 CDT  ?  Right flank pain  ?KUB x-ray today  Ordered:  XR Abdomen KUB 1 View, Routine, *Est. 08/22/18 3:00:00 CDT, Other (please specify), flank pain hx kidney stone, None, Ambulatory, Rad Type, Order for future visit, Right flank pain  History of kidney stones, Not Scheduled, *Est. 08/22/18 3:00:00 CDT  ?  Thrush of mouth and esophagus  ?Diflucan x 3 days  Ordered:  fluconazole, 150 mg = 1 tab(s), Oral, Daily, # 3 tab(s), 0 Refill(s), Pharmacy: [id number]  ?  Visit for screening mammogram  ?  Orders:  Holter Monitor 24/48 Hour Application/Removal, *Est. 08/22/18 13:00:00 CDT, *Est. Stop date 08/22/18 13:00:00 CDT, Order for future visit, Palpitation, Not Required, [facility]   Problem List/Past Medical History  Ongoing  HTN - Hypertension  Hypothyroidism  Obesity  Historical  Diabetes  Kidney stone  Procedure/Surgical History  Biopsy Gastrointestinal (04/19/2018)  Colonoscopy (04/19/2018)  Colonoscopy, flexible; with ablation of tumor(s), polyp(s), or other lesion(s) (includes pre- and post-dilation and guide wire passage, when performed) (04/19/2018)  Destruction of Rectum, Via Natural or Artificial Opening Endoscopic (04/19/2018)  Esophagogastroduodenoscopy (04/19/2018)  Esophagogastroduodenoscopy, flexible, transoral; with biopsy, single or multiple (04/19/2018)  Excision of Duodenum, Via Natural or Artificial Opening Endoscopic, Diagnostic (04/19/2018)  Excision of Stomach, Pylorus, Via Natural or Artificial Opening Endoscopic, Diagnostic (04/19/2018)  TOTAL THYROIDECTOMY  ()  HYSTERECTOMY ()  UMBILICAL HERNIA REPAIR ()  CSECTION ()  CSECTION ()   Medications  amLODIPine 10 mg oral tablet, 10 mg= 1 tab(s), Oral, Daily, 11 refills  baclofen 10 mg oral tablet, 5 mg= 0.5 tab(s), Oral, q8hr  cetirizine 10 mg oral tablet, See Instructions, 5 refills  CoQ10, 300 mg, Oral, Daily  levothyroxine 88 mcg (0.088 mg) oral tablet, 88 mcg= 1 tab(s), Oral, Daily  magnesium aspartate 1230 mg (122 mg magnesium) oral powder for reconstitution, 1 packet(s), Oral, Daily  Omega-3 oral capsule, 1, Oral, Daily  ONDANSETRON HCL 8 MG TABS, 8 mg= 1 tab(s), Oral, TID  Protonix 40 mg ORAL enteric coated tablet, 40 mg= 1 tab(s), Oral, Daily, 1 refills,? ?Not taking  raNITIdine 300 mg oral capsule, 300 mg= 1 cap(s), Oral, Daily  selenium, 100 mcg, Oral, Daily  VENTOLIN  MCG/ACT AERS  Vitamin D3  zinc (as acetate) 50 mg oral capsule, 50 mg= 1 cap(s), Oral, TID  Allergies  Accupril  Bactrim  Social History  Alcohol - Denies Alcohol Use, 2016  Current, 1-2 times per year, 2018  Never, 2016  Employment/School  Unemployed, Work/School description: DISABLE., 2016  Exercise  Home/Environment  Lives with Alone. Feels unsafe at home: No. Safe place to go: Yes. Family/Friends available for support: Yes., 2016  Nutrition/Health  Type of diet: nausea when thinking of food for months.Just nibbles throughtout the day.. Regular, Wants to lose weight: Yes. Eating disorders: craving sour foods and chocolate for months. . Sleeping concerns: No. Feels highly stressed: No., 2016  Other  Sexual  Substance Abuse - Denies Substance Abuse, 2016  Current, Marijuana, 1-2 times per month, 2018  Tobacco - Denies Tobacco Use, 2016  Former smoker Use:. Cigarettes Type:., 2018  Family History  Cardiac arrest.: Mother.  Congestive heart disease.: Father.  : Mother and Father.  Heart disease: Father.  Hyperthyroidism.: Negative: Sister.  Hypothyroidism.:  Negative: Sister.  Health Maintenance  Health Maintenance  ???Pending?(in the next year)  ??? ??OverDue  ??? ? ? ?Diabetes Maintenance-Eye Exam due??07/06/18??and every 1??year(s)  ??? ??Due?  ??? ? ? ?Alcohol Misuse Screening due??07/28/18??and every 1??year(s)  ??? ? ? ?Aspirin Therapy for CVD Prevention due??08/22/18??and every 1??year(s)  ??? ? ? ?Breast Cancer Screening ([facility]) due??08/22/18??and every?  ??? ? ? ?Functional Assessment due??08/22/18??and every 1??year(s)  ??? ? ? ?Hypertension Management-Education due??08/22/18??and every 1??year(s)  ??? ? ? ?Pneumococcal Vaccine due??08/22/18??and every 100??year(s)  ??? ? ? ?Pneumococcal Vaccine due??08/22/18??and every?  ??? ? ? ?Tetanus Vaccine due??08/22/18??and every 10??year(s)  ??? ? ? ?Zoster Vaccine due??08/22/18??and every 100??year(s)  ??? ??Due In Future?  ??? ? ? ?Smoking Cessation not due until??10/24/18??and every 1??year(s)  ??? ? ? ?Diabetes Maintenance-Foot Exam not due until??01/15/19??and every 1??year(s)  ??? ? ? ?Diabetes Maintenance-Medication Prescribed not due until??01/15/19??and every 1??year(s)  ??? ? ? ?Diabetes Maintenance-Fasting Lipid Profile not due until??06/08/19??and every 1??year(s)  ??? ? ? ?Diabetes Maintenance-HgbA1c not due until??08/01/19??and every 1??year(s)  ??? ? ? ?Diabetes Maintenance-Microalbumin not due until??08/01/19??and every 1??year(s)  ??? ? ? ?Hypertension Management-Blood Pressure not due until??08/03/19??and every 1??year(s)  ??? ? ? ?Hypertension Management-BMP not due until??08/12/19??and every 1??year(s)  ??? ? ? ?Diabetes Maintenance-Serum Creatinine not due until??08/12/19??and every 1??year(s)  ??? ? ? ?Diabetes Maintenance-Urine Dipstick not due until??08/12/19??and every 1??year(s)  ???Satisfied?(in the past 1 year)  ??? ??Satisfied?  ??? ? ? ?Blood Pressure Screening on??08/22/18.??Satisfied by Day Christine ANTONIO  ??? ? ? ?Body Mass Index Check on??08/22/18.??Satisfied by Day MARISELA,  Christine S.  ??? ? ? ?Bone Density Screening on??02/01/18.??Satisfied by Scarlett Ocampo.  ??? ? ? ?Colorectal Screening on??04/19/18.  ??? ? ? ?Colorectal Screening ([facility]) on??04/19/18.  ??? ? ? ?Depression Screening on??08/22/18.??Satisfied by Day MARISELA, Christine S.  ??? ? ? ?Diabetes Maintenance-Eye Exam on??10/27/17.??Satisfied by Kimberly Caldera LPN  ??? ? ? ?Diabetes Maintenance-Fasting Lipid Profile on??06/08/18.??Satisfied by Araceli Jaimes  ??? ? ? ?Diabetes Maintenance-HgbA1c on??11/23/18.??Satisfied by Ale Thomas NP  ??? ? ? ?Diabetes Maintenance-Microalbumin on??08/01/18.??Satisfied by Jess Madden  ??? ? ? ?Diabetes Maintenance-Serum Creatinine on??08/12/18.??Satisfied by Alma Ac  ??? ? ? ?Diabetes Maintenance-Urine Dipstick on??08/12/18.??Satisfied by Simi Minor  ??? ? ? ?Diabetes Screening on??08/12/18.??Satisfied by Alma Ac  ??? ? ? ?Fall Risk Assessment on??08/22/18.??Satisfied by Day MARISELA, Christine S.  ??? ? ? ?Hypertension Management-Blood Pressure on??08/22/18.??Satisfied by Day MARISELA, Christine S.  ??? ? ? ?Lipid Screening on??11/23/18.??Satisfied by Ale Thomas NP  ??? ? ? ?Obesity Screening on??08/22/18.??Satisfied by Day MARISELA, Christine S.  ??? ? ? ?Smoking Cessation on??10/24/17.??Satisfied by Padilla Pichardo RN  ??? ? ? ?Smoking Cessation (Coronary Artery Disease) on??10/24/17.??Satisfied by [place] RN, Domini Ivelisse  ??? ? ? ?Smoking Cessation (Diabetes) on??10/24/17.??Satisfied by Marino POE, Padilla Oviedo  ??? ??Refused?  ??? ? ? ?Breast Cancer Screening (1 yr) on??01/15/18.??Recorded for William CAMARILLO, Ale Langford??Reason: Patient Refuses  ?  ?

## 2022-05-05 NOTE — HISTORICAL [facility] CERNER
This is a historical note converted from [employer]. Formatting and pictures may have been removed.  Please reference [employer] for original formatting and attached multimedia. Chief Complaint  follow up, stomach problems  History of Present Illness  68 year old female here today for F/U. DM A1C? 6.9 at goal. HTN at goal. Pt reports bilateral knee pain with pressure behind her knees and falling at times. Pain is constant and burning not releived with?OTC meds. Pt also reports upper abd pain not improved with H pylori treatment. She has constant nausea. ?Pt reports palpitations daily and feeling her heart beat in her throat.  Review of Systems  All Systems Negative Except: See HPI  Physical Exam  Vitals & Measurements  T:?36.8? ?C (Oral)? HR:?66(Peripheral)? RR:?20? BP:?126/81?  HT:?154?cm? HT:?154?cm? WT:?99.6?kg? WT:?99.6?kg? BMI:?42?  General:? Alert and oriented, No acute distress, General health good  Eye:? Pupils are equal, round and reactive to light, Normal conjunctiva.?  HENT:? Normocephalic, Normal hearing, Oral mucosa is moist, No pharyngeal erythema.?  Neck:? Supple, Non-tender, No carotid bruit, No jugular venous distention, No lymphadenopathy, No thyromegaly.?  Respiratory:? Lungs are clear to auscultation, Respirations are non-labored, Breath sounds are equal, Symmetrical chest wall expansion.?  Cardiovascular:? Normal rate, Regular rhythm, No murmur, Good pulses equal in all extremities, Normal peripheral perfusion, No edema.?  Gastrointestinal:? Soft, Non-tender, Non-distended, Normal bowel sounds, No organomegaly.?  Musculoskeletal: Normal range of motion, Normal strength, No tenderness, No deformity, Normal gait.?  Integumentary:? Warm, Dry.?  Neurologic:? Alert, Oriented.?  Psychiatric:? Cooperative, Appropriate mood & affect.?  ?  ?  ?  Assessment/Plan  Bilateral knee pain  refer to ortho  X-Ray today  Ordered:  Internal Referral to Orthopedics, Bilateral knee pain falls, *Est. 09/02/18 3:00:00 CDT,  Future Visit?, Bilateral knee pain  XR Knee Left 3 Views, Routine, *Est. 08/03/18 3:00:00 CDT, Pain, bilteral knee pain falls, None, Ambulatory, Rad Type, Order for future visit, Bilateral knee pain, Not Scheduled, *Est. 08/03/18 3:00:00 CDT  XR Knee Right 3 Views, Routine, *Est. 08/03/18 3:00:00 CDT, Pain, bilteral knee pain falls, None, Ambulatory, Rad Type, Order for future visit, Bilateral knee pain, Not Scheduled, *Est. 08/03/18 3:00:00 CDT  ?  Diabetes  A1C? 6.9 at goal  Continue medications  On ACE, Statin according to guidelines  Follow ADA diet, avoid soda, simple sweets, and limit rice, breads and starches  Maintain healthy weight, exercise 4-5 times a week for 30?minutes  RTC?4 months?lab s  Ordered:  Clinic Follow up, *Est. 11/23/18 3:00:00 CST, 4 month F/U with labs, Order for future visit, Diabetes  HTN - Hypertension, [facility]  Comprehensive Metabolic Panel, Routine collect, *Est. 11/23/18 3:00:00 CST, Blood, Order for future visit, *Est. Stop date 11/23/18 3:00:00 CST, Lab Collect, Diabetes, 08/03/18 8:07:00 CDT  Hemoglobin [facility], Routine collect, *Est. 11/23/18 3:00:00 CST, Blood, Order for future visit, *Est. Stop date 11/23/18 3:00:00 CST, Lab Collect, Diabetes, 08/03/18 8:07:00 CDT  Lipid Panel, Routine collect, *Est. 11/23/18 3:00:00 CST, Blood, Order for future visit, *Est. Stop date 11/23/18 3:00:00 CST, Lab Collect, Diabetes, 08/03/18 8:07:00 CDT  Microalbum/Creatinine Ratio Urine (Microalb/Creat), Routine collect, Urine, Order for future visit, *Est. 11/23/18 3:00:00 CST, *Est. Stop date 11/23/18 3:00:00 CST, Nurse collect, Diabetes  ?  Gastritis  Avoid spicy, acidic, fried foods and alcohol  Eat 2-3 hours before going to bed  Avoid tight clothing, chew food thoroughly  Reduce Caffeine intake, avoid soda  Protonix 40mg ?for 30 days  Stop tobacco use  Ordered:  pantoprazole, 40 mg = 1 tab(s), Oral, Daily, # 30 tab(s), 1 Refill(s), Pharmacy: [place] [id number] [id number]  ?  HTN -  Hypertension  At goal  Low sodium diet (Dash Diet)  Continue Medications as prescribed  Monitor Blood daily, report any consistent numbers greater than 140/90  Maintain healthy weight  Ordered:  Clinic Follow up, *Est. 11/23/18 3:00:00 CST, 4 month F/U with labs, Order for future visit, Diabetes  HTN - Hypertension, [facility]  ?  Hypothyroidism  Lab with next visit  Ordered:  Free T4, Routine collect, *Est. 11/23/18 3:00:00 CST, Blood, Order for future visit, *Est. Stop date 11/23/18 3:00:00 CST, Lab Collect, Hypothyroidism, 08/03/18 8:07:00 CDT  T4, Routine collect, *Est. 11/23/18 3:00:00 CST, Blood, Order for future visit, *Est. Stop date 11/23/18 3:00:00 CST, Lab Collect, Hypothyroidism, 08/03/18 8:07:00 CDT  Thyroid Stimulating Hormone, Routine collect, *Est. 11/23/18 3:00:00 CST, Blood, Order for future visit, *Est. Stop date 11/23/18 3:00:00 CST, Lab Collect, Hypothyroidism, 08/03/18 8:07:00 CDT  ?  Palpitation  Ordered:  Holter Monitor 24/48 Hour Application/Removal, *Est. 08/10/18 3:00:00 CDT, *Est. Stop date 08/10/18 3:00:00 CDT, Order for future visit, Palpitation, [facility]  ?   Problem List/Past Medical History  Ongoing  HTN - Hypertension  Hypothyroidism  Obesity  Historical  Diabetes  Kidney stone  Procedure/Surgical History  Biopsy Gastrointestinal (04/19/2018)  Colonoscopy (04/19/2018)  Colonoscopy, flexible; with ablation of tumor(s), polyp(s), or other lesion(s) (includes pre- and post-dilation and guide wire passage, when performed) (04/19/2018)  Destruction of Rectum, Via Natural or Artificial Opening Endoscopic (04/19/2018)  Esophagogastroduodenoscopy (04/19/2018)  Esophagogastroduodenoscopy, flexible, transoral; with biopsy, single or multiple (04/19/2018)  Excision of Duodenum, Via Natural or Artificial Opening Endoscopic, Diagnostic (04/19/2018)  Excision of Stomach, Pylorus, Via Natural or Artificial Opening Endoscopic, Diagnostic (04/19/2018)  TOTAL THYROIDECTOMY  ()  HYSTERECTOMY ()  UMBILICAL HERNIA REPAIR ()  CSECTION ()  CSECTION ()   Medications  amLODIPine 10 mg oral tablet, 10 mg= 1 tab(s), Oral, Daily, 11 refills  B Complex Cap, Oral, Daily  cetirizine 10 mg oral tablet, See Instructions, 5 refills  CoQ10, 300 mg, Oral, Daily  cyclobenzaprine 10 mg oral tablet, 10 mg= 1 tab(s), Oral, TID, PRN, 1 refills  levothyroxine 112 mcg (0.112 mg) oral tablet, 112 mcg= 1 tab(s), Oral, Daily, 11 refills  magnesium aspartate 1230 mg (122 mg magnesium) oral powder for reconstitution, 1 packet(s), Oral, Daily  Omega-3 oral capsule, 1, Oral, Daily  selenium, 100 mcg, Oral, Daily  VENTOLIN  MCG/ACT AERS  Vitamin D3  zinc (as acetate) 50 mg oral capsule, 50 mg= 1 cap(s), Oral, TID  Allergies  Accupril  Bactrim  Social History  Alcohol - Denies Alcohol Use, 2016  Never, 2016  Employment/School  Unemployed, Work/School description: DISABLE., 2016  Exercise  Home/Environment  Lives with Alone. Feels unsafe at home: No. Safe place to go: Yes. Family/Friends available for support: Yes., 2016  Nutrition/Health  Type of diet: nausea when thinking of food for months.Just nibbles throughtout the day.. Regular, Wants to lose weight: Yes. Eating disorders: craving sour foods and chocolate for months. . Sleeping concerns: No. Feels highly stressed: No., 2016  Other  Sexual  Substance Abuse - Denies Substance Abuse, 2016  Never, 2016  Tobacco - Denies Tobacco Use, 2016  Never smoker Use:., 2018  Family History  Cardiac arrest.: Mother.  Congestive heart disease.: Father.  : Mother and Father.  Heart disease: Father.  Hyperthyroidism.: Negative: Sister.  Hypothyroidism.: Negative: Sister.  Health Maintenance  Health Maintenance  ???Pending?(in the next year)  ??? ??Due?  ??? ? ? ?Alcohol Misuse Screening due??18??and every 1??year(s)  ??? ? ? ?Aspirin Therapy for CVD Prevention due??18??and every  1??year(s)  ??? ? ? ?Breast Cancer Screening ([facility]) due??08/03/18??and every?  ??? ? ? ?Diabetes Maintenance-Eye Exam due??08/03/18??and every 1??year(s)  ??? ? ? ?Functional Assessment due??08/03/18??and every 1??year(s)  ??? ? ? ?Hypertension Management-Education due??08/03/18??and every 1??year(s)  ??? ? ? ?Pneumococcal Vaccine due??08/03/18??and every 100??year(s)  ??? ? ? ?Pneumococcal Vaccine due??08/03/18??and every?  ??? ? ? ?Tetanus Vaccine due??08/03/18??and every 10??year(s)  ??? ? ? ?Zoster Vaccine due??08/03/18??and every 100??year(s)  ??? ??Due In Future?  ??? ? ? ?Smoking Cessation not due until??10/24/18??and every 1??year(s)  ??? ? ? ?Diabetes Maintenance-Foot Exam not due until??01/15/19??and every 1??year(s)  ??? ? ? ?Diabetes Maintenance-Medication Prescribed not due until??01/15/19??and every 1??year(s)  ??? ? ? ?Diabetes Maintenance-Fasting Lipid Profile not due until??06/08/19??and every 1??year(s)  ??? ? ? ?Diabetes Maintenance-Urine Dipstick not due until??06/08/19??and every 1??year(s)  ??? ? ? ?Hypertension Management-Blood Pressure not due until??06/12/19??and every 1??year(s)  ??? ? ? ?Fall Risk Assessment not due until??06/12/19??and every 1??year(s)  ??? ? ? ?Obesity Screening not due until??06/12/19??and every 1??year(s)  ??? ? ? ?Diabetes Maintenance-HgbA1c not due until??08/01/19??and every 1??year(s)  ??? ? ? ?Hypertension Management-BMP not due until??08/01/19??and every 1??year(s)  ??? ? ? ?Diabetes Maintenance-Microalbumin not due until??08/01/19??and every 1??year(s)  ??? ? ? ?Diabetes Maintenance-Serum Creatinine not due until??08/01/19??and every 1??year(s)  ???Satisfied?(in the past 1 year)  ??? ??Satisfied?  ??? ? ? ?Blood Pressure Screening on??06/12/18.??Satisfied by Kristy Dickson LPN  ??? ? ? ?Body Mass Index Check on??06/12/18.??Satisfied by Kristy Dickson LPN  ??? ? ? ?Bone Density Screening on??02/01/18.??Satisfied by Scarlett Ocampo  ??? ? ?  ?Colorectal Screening on??04/19/18.  ??? ? ? ?Colorectal Screening ([facility]) on??04/19/18.  ??? ? ? ?Depression Screening on??08/03/18.??Satisfied by Ana Khan LPN.  ??? ? ? ?Diabetes Maintenance-Eye Exam on??10/27/17.??Satisfied by Kimberly Caldera LPN  ??? ? ? ?Diabetes Maintenance-Fasting Lipid Profile on??06/08/18.??Satisfied by Araceli Jaimes  ??? ? ? ?Diabetes Maintenance-HgbA1c on??08/01/18.??Satisfied by Betty Conner P.  ??? ? ? ?Diabetes Maintenance-Microalbumin on??08/01/18.??Satisfied by Jess Madden  ??? ? ? ?Diabetes Maintenance-Serum Creatinine on??08/01/18.??Satisfied by Betty Conner P.  ??? ? ? ?Diabetes Maintenance-Urine Dipstick on??06/08/18.??Satisfied by Lisa Gray  ??? ? ? ?Diabetes Screening on??08/01/18.??Satisfied by Betty Conner.  ??? ? ? ?Fall Risk Assessment on??06/12/18.??Satisfied by Kristy Dickson LPN.  ??? ? ? ?Hypertension Management-BMP on??08/01/18.??Satisfied by Jess Madden  ??? ? ? ?Lipid Screening on??06/08/18.??Satisfied by Araceli Jaimes  ??? ? ? ?Obesity Screening on??06/12/18.??Satisfied by Kristy Dickson LPN.  ??? ? ? ?Smoking Cessation on??10/24/17.??Satisfied by Padilla Pichardo RN  ??? ??Refused?  ??? ? ? ?Breast Cancer Screening (1 yr) on??01/15/18.??Recorded for Ale Thomas NP??Reason: Patient Refuses  ?  ?

## 2022-05-05 NOTE — HISTORICAL [facility] CERNER
This is a historical note converted from Viv. Formatting and pictures may have been removed.  Please reference Viv for original formatting and attached multimedia. Chief Complaint  CHECK UP - FELL ON SUNDAY, MED CONSULT METFORMIN AND HCTZ rx refill, eye exam referral  History of Present Illness  67 year old AAF here today for fall from ladder with injury. Pt fell and hurt her left arm, right wrist and lower back, right knee and buttocks. She was working at a [employer] and using a latter to reach a shelf. She was climbing down and missed the bottom step and feel and hit a table. She hit her right side under her arm into her rib cage with pain.?All area tender with increased pain under right arm into chest pain. ?Pt also had recent Echo with right enlarged ventricle. She also reports apneic spells.  Review of Systems  All Systems Negative Except: See HPI  Physical Exam  Vitals & Measurements  T:?36.9? ?C ?(Oral)? HR:?90?(Peripheral)? RR:?16? BP:?122/85? HT:?154?cm? HT:?154?cm? HT:?154?cm? WT:?101.3?kg? WT:?101.3?kg? WT:?101.3?kg? BMI:?42.71?  General:? Alert and oriented, No acute distress.?  Eye:? Pupils are equal, round and reactive to light, Normal conjunctiva.?  HENT:? Normocephalic, Normal hearing, Oral mucosa is moist, No pharyngeal erythema.?  Neck:? Supple, Non-tender, No carotid bruit, No jugular venous distention, No lymphadenopathy, No thyromegaly.?  Respiratory:? Lungs are clear to auscultation, Respirations are non-labored, Breath sounds are equal, Symmetrical chest wall expansion.?  Cardiovascular:? Normal rate, Regular rhythm, No murmur, Good pulses equal in all extremities, Normal peripheral perfusion, No edema.?  Gastrointestinal:? Soft, Non-tender, Non-distended, Normal bowel sounds, No organomegaly.?  Musculoskeletal: Normal range of motion, Normal strength, No tenderness, No deformity, Normal gait.?  Integumentary:? Warm, Dry.?  Neurologic:? Alert, Oriented.?  Psychiatric:? Cooperative,  Appropriate mood & affect.?  ?  ?  Assessment/Plan  Apneic spell  ?Refer for sleep study  Ordered:  Internal Referral to Sleep Lab, enlarged right ventricle/ Symptoms of sleep apnea, 05/31/17 13:55:00 CDT, Enlarged RV (right ventricle)  Apneic spell  ?  Diabetes  ?refer for eye exam  Ordered:  Internal Referral to Ophthalmology, DM eye exam, *Est. 06/30/17 3:00:00 CDT, Future Visit?, Diabetes  ?  Enlarged RV (right ventricle)  ?Refer for sleep study  Ordered:  Internal Referral to Sleep Lab, enlarged right ventricle/ Symptoms of sleep apnea, 05/31/17 13:55:00 CDT, Enlarged RV (right ventricle)  Apneic spell  ?  Fall  ?Xray chest today and shoulder  Ordered:  XR Chest 2 Views, Routine, *Est. 05/31/17 3:00:00 CDT, Other (please specify), fall with injury, None, Ambulatory, Rad Type, Order for future visit, Fall, On Exactly, *Est. 05/31/17 3:00:00 CDT  XR Shoulder Right Minimum 2 Views, Routine, *Est. 05/31/17 3:00:00 CDT, Other (please specify), fall with injury, None, Ambulatory, Rad Type, Order for future visit, Fall, On Exactly, *Est. 05/31/17 3:00:00 CDT  ?  Orders:  cyclobenzaprine, 10 mg = 1 tab(s), Oral, TID, PRN PRN for spasm, # 90 tab(s), 0 Refill(s), Pharmacy: [place] [id number] [id number]  traMADol, 50 mg = 1 tab(s), Oral, q12hr, PRN PRN as needed for pain, # 60 tab(s), 0 Refill(s)   Problem List/Past Medical History  Diabetes  HTN - Hypertension  Hypothyroidism  Obesity  Historical  Kidney stone  Procedure/Surgical History  TOTAL THYROIDECTOMY (2014), HYSTERECTOMY (1991), UMBILICAL HERNIA REPAIR (1986), CSECTION (1977), CSECTION (1971).  Medications  amitriptyline 50 mg oral tablet, 50 mg, 1 tab(s), Oral, Once a day (at bedtime), 11 refills  amLODIPine 10 mg oral tablet, See Instructions, 3 refills  cetirizine 10 mg oral tablet, 10 mg, 1 tab(s), Oral, Daily, 5 refills  dicyclomine 20 mg oral tablet, 20 mg, 1 tab(s), Oral, QID, 1 refills  hydrochlorothiazide 12.5 mg oral tablet, 12.5 mg, 1 tab(s), Oral, Daily, 11  refills  levothyroxine 112 mcg (0.112 mg) oral tablet, 112 mcg, 1 tab(s), Oral, Daily, 5 refills  magnesium aspartate 1230 mg (122 mg magnesium) oral powder for reconstitution, 1 packet(s), Oral, Daily  meloxicam 7.5 mg oral tablet, 7.5 mg, 1 tab(s), Oral, BIDAC, 10 refills  metformin 500 mg oral tablet, 500 mg, 1 tab(s), Oral, BID, 3 refills  ondansetron 8 mg oral tablet, 8 mg, 1 tab(s), Oral, q8hr, 1 refills  VENTOLIN  MCG/ACT AERS  Vitamin D3  Allergies  Accupril  Bactrim  Social History  Alcohol - Denies Alcohol Use  Never  Employment/School  Unemployed, Work/School description: DISABLE.  Home/Environment  Lives with Alone. Feels unsafe at home: No. Safe place to go: Yes. Family/Friends available for support: Yes.  Nutrition/Health  Type of diet: nausea when thinking of food for months.Just nibbles throughtout the day.. Regular, Wants to lose weight: Yes. Eating disorders: craving sour foods and chocolate for months. . Sleeping concerns: No. Feels highly stressed: No.  Other  Sexual  Substance Abuse - Denies Substance Abuse  Never  Tobacco - Denies Tobacco Use  Never smoker  Family History  Cardiac arrest.: Mother.  Congestive heart disease.: Father.  : Mother and Father.  Heart disease: Father.  Hyperthyroidism.: Negative: Sister.  Hypothyroidism.: Negative: Sister.

## 2022-05-05 NOTE — HISTORICAL [facility] CERNER
This is a historical note converted from Viv. Formatting and pictures may have been removed.  Please reference Viv for original formatting and attached multimedia. Chief Complaint  Bilateral knee pain  History of Present Illness  68 Years?yo?RHD??Female?non-smoker?[facility]??for?initial visit?for?bilateral?knee pain. Patient points to?medial knee.  Onset:?insidious over years  Current pain level: 4/10 (rated as?moderate)?without pain medication. Quality described as sharp.  Modifying Factors:?worse with activity;improved with rest?;?stiffness after immobilization;?stiffness improved with less than 30 minutes of activity  Previous treatment:?medications, CSI x5. Last one a year ago and only provided a couple weeks of relief.  Previous injuries:?Fell down s  Associated Symptoms:crepitus/grinding;?no numbness or tingling?;?no swelling?;?no skin changes;?no weakness?;?mild decrease in ROM. +knee locking up, feeling like they  Activity:?sedentary;;not full ADLs;pain interferes with function  Family History:?family history of arthritis  Occupation: _  Review of Systems  Constitutional: no fever, no chills, no weight loss  CV: no swelling, no edema  Resp: no SOB, wheezing  : no urinary retention, no urinary incontinence  GI: no fecal incontinence  Skin: no rash, no wound  Neuro: no numbness/tingling, no weakness, no saddle anesthesia  MSK: as above  Psych: no depression, no anxiety  Heme/Lymph: no easy bruising, no easy bleeding, no lymphadenopathy  Immuno: no MRSA history  ?  Physical Exam  Vitals & Measurements  HR:?90(Peripheral)? BP:?136/86?  HT:?157?cm? HT:?157?cm? WT:?93.44?kg? WT:?93.44?kg? BMI:?37.91?  General: well developed; well nourished; cooperative  PSYCH: alert and oriented x 3 with appropriate mood and affect  SKIN: inspection and palpation of skin and soft tissue normal; no scars noted on upper/lower extremities  CV: vascular integrity noted; +2 symmetrical pulses, no  edema  RESP: non-labored, symmetrical chest rise, no audible wheeze noted.  NEURO: sensation intact by light touch; DTRs +2 bilateral and symmetrical  LYMPH: no LAD noted  ?   MUSCULOSKELETAL:  RIGHT KNEE:  Inspection: Normal gait, full weightbearing, normal alignment, no swelling, no erythema, no bruising, no atrophy  Palpation: +TTP medial joint space  ROM:?  ?????Flexion (0-140):?125 degrees  ?????Extension: 0 degrees  Crepitus: +  Strength: Flexion 5/5, Extension 5/5  Neurovascular: 2+ distal pulse bilaterally, sensation intact  ?  Special Tests:  Ballotable Effusion:Negative  Fluid Wave:Negative  Patellar Compression:Negative  Medial Retinaculum: non tender  Lateral Retinaculum: non tender  Lachmans:Negative  Posterior Drawer:Negative  McMurrays: Negative  Apleys Compression:Negative  Varus Stress: negative  Valgus Stress: +  ?  LEFT KNEE:  Inspection: Normal gait, full weightbearing, normal alignment, no swelling, no erythema, no bruising, no atrophy  Palpation: +TTP medial joint line  ROM:?  ?????Flexion (0-140):?130 degrees  ?????Extension: 0 degrees  Crepitus: +  Strength: Flexion 5/5, Extension 5/5  Neurovascular: 2+ distal pulse bilaterally, sensation intact  ?  Special Tests:  Ballotable Effusion:Negative  Fluid Wave:Negative  Patellar Compression:Negative  Medial Retinaculum: non tender  Lateral Retinaculum: non tender  Lachmans:Negative  Posterior Drawer:Negative  McMurrays: Negative  Apleys Compression:not performed  Varus Stress: negative  Valgus Stress: +  ?  Assessment/Plan  1.?Degenerative arthritis of knee, bilateral  Ordered:  XR Knee Left 3 Views, Routine, 10/16/18 9:47:00 CDT, Arthritis, None, Ambulatory, Rad Type, Degenerative arthritis of knee, bilateral, Not Scheduled, 10/16/18 9:47:00 CDT  XR Knee Right 3 Views, Routine, 10/16/18 9:47:00 CDT, Arthritis, None, Ambulatory, Rad Type, Degenerative arthritis of knee, bilateral, Not Scheduled, 10/16/18 9:47:00 CDT  ?  2.?Bilateral knee  pain  ?  3.?Obesity  ?  - Dx: b/l knee djd  - Discussed with patient diagnosis and treatment recommendations. Handout given.  B/l knee XR Imaging:?radiological studies ordered and independently reviewed by me; discussed with patient; pending radiologist interpretation  - Plan for management: discussed conservative treatment options with patient. Offered CSI. Will proceed with Hyalgen injection series.  - medications: No NSAIDS d/t GI issues.  - Therapy: Formal PT,?HEP.  - Follow-up: Friday 10/19/18  ?  ?   Problem List/Past Medical History  Ongoing  HTN - Hypertension  Hypothyroidism  Obesity  Historical  Diabetes  Kidney stone  Procedure/Surgical History  Biopsy Gastrointestinal (04/19/2018)  Colonoscopy (04/19/2018)  Colonoscopy, flexible; with ablation of tumor(s), polyp(s), or other lesion(s) (includes pre- and post-dilation and guide wire passage, when performed) (04/19/2018)  Destruction of Rectum, Via Natural or Artificial Opening Endoscopic (04/19/2018)  Esophagogastroduodenoscopy (04/19/2018)  Esophagogastroduodenoscopy, flexible, transoral; with biopsy, single or multiple (04/19/2018)  Excision of Duodenum, Via Natural or Artificial Opening Endoscopic, Diagnostic (04/19/2018)  Excision of Stomach, Pylorus, Via Natural or Artificial Opening Endoscopic, Diagnostic (04/19/2018)  TOTAL THYROIDECTOMY (2014)  HYSTERECTOMY (1991)  UMBILICAL HERNIA REPAIR (1986)  CSECTION (1977)  CSECTION (1971)   Medications  amLODIPine 10 mg oral tablet, 10 mg= 1 tab(s), Oral, Daily, 11 refills  baclofen 10 mg oral tablet, 5 mg= 0.5 tab(s), Oral, q8hr  cetirizine 10 mg oral tablet, See Instructions, 5 refills  CoQ10, 300 mg, Oral, Daily  Diflucan 150 mg oral tablet, 150 mg= 1 tab(s), Oral, Daily,? ?Not taking  KETOCONAZOLE 2 % CREA  levothyroxine 100 mcg (0.1 mg) oral tablet, 100 mcg= 1 tab(s), Oral, Daily, 2 refills  levothyroxine 88 mcg (0.088 mg) oral tablet, 88 mcg= 1 tab(s), Oral, Daily  LEVOTHYROXINE SODIUM 112 MCG TABS,  112 mcg= 1 tab(s), Oral, Daily,? ?Not taking  magnesium aspartate 1230 mg (122 mg magnesium) oral powder for reconstitution, 1 packet(s), Oral, Daily  MELOXICAM 15 MG TABS, 15 mg= 1 tab(s), Oral, Daily  METHYLPREDNISOLONE DOSE PACK 4 MG TBPK,? ?Not taking  METRONIDAZOLE 500 MG TABS, 500 mg= 1 tab(s), Oral, BID,? ?Not taking  MiraLax oral powder for reconstitution, 17 gm, Oral, Daily, 1 refills  Omega-3 oral capsule, 1, Oral, Daily  OMEPRAZOLE 20 MG CPDR  ONDANSETRON HCL 8 MG TABS, 8 mg= 1 tab(s), Oral, TID  Protonix 40 mg ORAL enteric coated tablet, 40 mg= 1 tab(s), Oral, Daily, 1 refills,? ?Not taking  raNITIdine 300 mg oral capsule, 300 mg= 1 cap(s), Oral, Daily  selenium, 100 mcg, Oral, Daily  VENTOLIN  MCG/ACT AERS  Vitamin D3  zinc (as acetate) 50 mg oral capsule, 50 mg= 1 cap(s), Oral, TID  Allergies  Accupril  Bactrim  Social History  Alcohol - Denies Alcohol Use, 2016  Current, 1-2 times per year, 2018  Never, 2016  Employment/School  Unemployed, Work/School description: DISABLE., 2016  Exercise  Home/Environment  Lives with Alone. Feels unsafe at home: No. Safe place to go: Yes. Family/Friends available for support: Yes., 2016  Nutrition/Health  Type of diet: nausea when thinking of food for months.Just nibbles throughtout the day.. Regular, Wants to lose weight: Yes. Eating disorders: craving sour foods and chocolate for months. . Sleeping concerns: No. Feels highly stressed: No., 2016  Other  Sexual  Substance Abuse - Denies Substance Abuse, 2016  Current, Marijuana, 1-2 times per month, 2018  Tobacco - Denies Tobacco Use, 2016  Former smoker Use:. Cigarettes Type:., 2018  Family History  Cardiac arrest.: Mother.  Congestive heart disease.: Father.  : Mother and Father.  Heart disease: Father.  Hyperthyroidism.: Negative: Sister.  Hypothyroidism.: Negative: Sister.  Diagnostic Results  B/L knee XR ordered and independently  interpreted by myself: Tri-compartmental DJD noted. Medial?joint space worse than lateral.      Patient also given script for Right medial off-loading brace.   Discussed with the fellow at time of visit? Patient chart reviewed. Patient seen and evaluated at time of visit.?HPI, PE, and Assessment and Plan reviewed. Treatment plan is reasonable and appropriate.? All questions were answered.Compliance with treatment plan is appropriate.  Radiology images independently reviewed and agree with radiologist. ?Radiology images independently reviewed and agree with fellow.

## 2022-05-05 NOTE — HISTORICAL [facility] CERNER
This is a historical note converted from Viv. Formatting and pictures may have been removed.  Please reference Viv for original formatting and attached multimedia. Chief Complaint  Follow up  History of Present Illness  68 yo Black?female being seen in clinic today for f/u, medication reconciliation, and review of recent labs (July 18, 2019).? Hx includes diabetes (no anti-diabetics presently), does not want to add new medications due to stomach issues, hypertension, asthma, chronic rhinitis, atypical chest pain, moderate chronic gastritis in stomach, chronic anemia, hypothyroidism, OA of bilateral knees and spine, insomnia, and kidney stones in past.???Followed by [facility] for Hyalgan injections to bilateral knees; last injection done 6/7/2019.? Presented to ER on 5/12/19 with c/o chest pain; normal EKG and cardiac enzymes; discharged home.??Former smoker, in college; light smoker.? Smokes marijuana 1 to 2?times/month.?? Needs FIT, MMG today.? Not prescribed ACE or ARB presently.??Allergic to ACE inhibitors, my throat swelled.? Living with friends and family; dietary intake changed drastically with that.? Hx includes sleep apnea, which she is not treating currently.? Unable to pay balance @ DME company for new supplies.? Ms. Blue had to move out of her rented home due to large rats recently, and am pretty much homeless right now.? Relies upon family and friends for shelter.?  ?  BMI: 40.08  Hysterectomy hx  Fundus exam:? 3/12/19  DEXA 2/1/2018: normal findings  MRI Brain 12/19/2018:? chronic microvascular changes.? No acute findings.? Possible remote bilateral lacunar infarct.?  4/19/2018 EGD and colonoscopy:? moderate chronic active gastritis in stomach, with helicobacter like organisms, and two tubular adenomatous polyps?in colon, non-malignant.? Treated for H. Pylori infection. Trialed Zantac and Protonix in past.?Stopped taking those medications because scripts ran out.  TSH normal  12/14/2018  Vitamin D level 35.02 4/12/2018  B12 normal 6/12/2018  Last CBC 5/12/2019  Rheumatoid work-up negative 5/12/2017  +tissue urease in antrum of stomach 5/8/2018 1/14/2019 stress test negative for SOB and ischemia  8/22/2018 Holter monitor - NSR throughout, with occasional PVC  ?  Review of Systems  Constitutional:?no fever, fatigue, weakness, or significant changes.? Not sleeping well; not eating well.? Says she is in house with teenagers with constant noise from television.? Not eating like she likes too with living with family/friends.?  Eye:?no vision loss, eye redness, drainage, or pain  ENMT:?no sore throat, ear pain, sinus pain/congestion, nasal congestion/drainage.? Having some muffled hearing changes in the last 3 months.?  Respiratory:?no cough, no wheezing, no shortness of breath.? Having some discomfort with deep inspiration over left lung, which has worsened over past several months with erratic living conditions.?  Cardiovascular:?no chest pain, no palpitations, mild edema in feet  Gastrointestinal:?no nausea, vomiting, constipation,?or diarrhea. Having transient abdominal pain, depending upon diet.? Has gas depending on diet.?  Genitourinary:?no dysuria, having increased urinary frequency and urgency, no hematuria.? Continent of bladder.?  Hema/Lymph:?no abnormal bruising or bleeding  Endocrine:?no heat or cold intolerance, no excessive thirst.?  Musculoskeletal:?no muscle or joint pain, no joint swelling.? Very satisfied with joint injections.? Says she is able to walk more, and climb stairs with much less discomfort.?  Integumentary:?no skin rash or abnormal lesion.? Has generalized dry skin.?  Neurologic: Has a moderate intensity headache this morning.? Having some dizziness on occasion, with bending over; having mild numbness and tingling in hands and feet @ night primarily.?  Psychiatric: no depression, anxiety, insomnia, or suicidal thoughts.??  ?  Physical Exam  Vitals &  Measurements  T:?36.8? ?C (Oral)? HR:?74(Peripheral)? RR:?18? BP:?116/80?  HT:?160?cm? WT:?102.6?kg? BMI:?40.08?  General:? VSS; afebrile.??Well-developed well-nourished in no acute distress  Eye: PERRLA, EOMI, clear conjunctiva, eyelids normal  Neck: full range of motion, no thyromegaly or lymphadenopathy  Respiratory:?clear to auscultation bilaterally; diminished over anterior bases bilaterally  Cardiovascular:?regular rate and rhythm without murmurs, gallops or rubs.? Trace nonpitting edema over lower legs and dorsal feet.  Gastrointestinal:?obese abdomen, rounded, soft, non-tender,?with normal bowel sounds, without masses to palpation  Genitourinary: deferred  Musculoskeletal:?full range of motion of all extremities/spine without limitation or discomfort  Integumentary: no rashes or skin lesions present  Neurologic: cranial nerves intact, no signs of peripheral neurological deficit, motor/sensory function intact  Psychiatric: Calm, cooperative.? Mood and affect normal.? No s/s of depression or anxiety.? Responses appropriate.?  ?  Assessment/Plan  1.?Diabetes mellitus, type 2?E11.9  HgbA1C 7.1% on 7/18/19.? Not on any anti-diabetic meds.? Diet-controlled.? She refuses to try Metformin or other medications, stating diabetes is always well-controlled when she is able to cook for self.? Teaching provided on etiology of disease process, complications of diabetes, goal HgbA1C, normal, low, and high CBGs.? Teaching provided on diabetic foot care principles, and when to notify medical provider of toenail, foot, or nail changes.? Teaching provided on foods high in carbs and how that impairs diabetes.? Teaching provided to avoid sugary drinks, and substitute with water.??  Ordered:  1160F- Medication reconciliation completed during visit, Diabetes mellitus, type 2  HTN - Hypertension  Hypothyroidism  Gastritis, [facility], 07/23/19 8:57:00 CDT  Clinic Follow up, *Est. 01/23/20 9:00:00 CST, Order for future visit,  Chronic rhinitis, [facility]  Comprehensive Metabolic Panel, Routine collect, *Est. 01/23/20 3:00:00 CST, Blood, Order for future visit, *Est. Stop date 01/23/20 3:00:00 CST, Lab Collect, HTN - Hypertension  Diabetes mellitus, type 2, 07/23/19 8:59:00 CDT  Hemoglobin A1C [facility], Routine collect, *Est. 01/23/20 3:00:00 CST, Blood, Order for future visit, *Est. Stop date 01/23/20 3:00:00 CST, Lab Collect, HTN - Hypertension  Diabetes mellitus, type 2, 07/23/19 8:59:00 CDT  Office/Outpatient Visit Level 4 Established 82206 PC, Diabetes mellitus, type 2  HTN - Hypertension  Hypothyroidism  Gastritis, [facility], 07/23/19 8:56:00 CDT  ?  2.?Mixed hyperlipidemia?E78.2  ?Chol 222, HDL 54, , triglycerides 127.? RX Pravastain 20 mg today.? Teaching provided on normal cholesterol, HDL, LDL, and triglyceride levels, along with complications associated with abnormalities.? Teaching provided on weight loss, dietary changes, exercise, and statins in controlling lipids.??  Ordered:  pravastatin, 20 mg = 1 tab(s), Oral, Once a day (at bedtime), Take 1 tab at bedtime for cholesterol, # 90 tab(s), 2 Refill(s), Pharmacy: [facility] [id number]  Lipid Panel, Routine collect, *Est. 01/23/20 3:00:00 CST, Blood, Order for future visit, *Est. Stop date 01/23/20 3:00:00 CST, Lab Collect, Hyperlipidemia, mixed, 07/23/19 8:59:00 CDT  ?  3.?HTN - Hypertension?I10  B/Cr 13 & 1.0, with eGFR 71.? Teaching provided on need for adequate hydration for kidney health.? She relays she drinks very little water/day.? Teaching? provided on goal B/P readings to prevent end-organ complications, low sodium diet principles, and current anti-hypertensive regimen.? Edema in feet and legs is very mild today; not going to change medications @ this time.? Allergic to ACE inhibitors with throat swelling.?  Ordered:  amLODIPine, 10 mg, Oral, Daily, # 90 tab(s), 2 Refill(s), Pharmacy: [facility] [id number] [facility] #[id number]- Medication reconciliation completed  during visit, Diabetes mellitus, type 2  HTN - Hypertension  Hypothyroidism  Gastritis, [facility], 07/23/19 8:57:00 CDT  Clinic Follow up, *Est. 01/23/20 9:00:00 CST, Order for future visit, Chronic rhinitis, [facility]  Comprehensive Metabolic Panel, Routine collect, *Est. 01/23/20 3:00:00 CST, Blood, Order for future visit, *Est. Stop date 01/23/20 3:00:00 CST, Lab Collect, HTN - Hypertension  Diabetes mellitus, type 2, 07/23/19 8:59:00 CDT  Hemoglobin A1C [facility], Routine collect, *Est. 01/23/20 3:00:00 CST, Blood, Order for future visit, *Est. Stop date 01/23/20 3:00:00 CST, Lab Collect, HTN - Hypertension  Diabetes mellitus, type 2, 07/23/19 8:59:00 CDT  Office/Outpatient Visit Level 4 Established 26203 PC, Diabetes mellitus, type 2  HTN - Hypertension  Hypothyroidism  Gastritis, [facility], 07/23/19 8:56:00 CDT  ?  4.?Chest pain in adult?R07.9  Atypical chest pain, probably secondary to poorly-controlled GERD.? She has been using some kind of OTC Magnesium product.? RX for Zantac and Pantoprazole today.? Handout provided today.? Instructed on importance of weight loss to relieve GI pressure onto esophagus; dietary changes, foods and beverages to avoid, raising HOB 6 to 8 inches with blocks under bedframe, avoiding alcohol, and fatty foods, avoiding lying down for 3 hours after a meal.??Instructed on use, rationale, how to use, and expected outcomes of new medications.??  Ordered:  XR Chest 2 Views, Routine, 07/23/19 9:45:00 CDT, Chest Pain, None, Ambulatory, Rad Type, Chest pain in adult, Not Scheduled, 07/23/19 9:45:00 CDT  ?  5.?Gastritis?K29.70  ?Treated for H. Pylori back in 2018 for moderate gastritis.? PPI and H2 ordered.? Instructed on use, rationale, how to use, and expected outcomes of medication.?? FIT ordered?today.  Ordered:  pantoprazole, 40 mg = 1 tab(s), Oral, Daily, Take 1 tab daily for acid reflux, # 90 tab(s), 2 Refill(s), Pharmacy: [place] [facility] [id number] [id number]  ranitidine, 150 mg = 1  tab(s), Oral, BID, Take 1 tab twice daily for acid reflux, # 60 tab(s), 5 Refill(s), Pharmacy: [id number]  1160F- Medication reconciliation completed during visit, Diabetes mellitus, type 2  HTN - Hypertension  Hypothyroidism  Gastritis, [facility], 07/23/19 8:57:00 CDT  CBC w/ Auto Diff, Routine collect, *Est. 01/23/20 3:00:00 CST, Blood, Order for future visit, *Est. Stop date 01/23/20 3:00:00 CST, Lab Collect, Gastritis, 07/23/19 8:59:00 CDT  Clinic Follow up, *Est. 01/23/20 9:00:00 CST, Order for future visit, Chronic rhinitis, [facility]  Office/Outpatient Visit Level 4 Established 34470 PC, Diabetes mellitus, type 2  HTN - Hypertension  Hypothyroidism  Gastritis, [facility] , 07/23/19 8:56:00 CDT  ?  6.?Hypothyroidism?E03.9  Repeat TSH and free T4 today.? TSH and free T4 normal 12/14/2018.  Ordered:  levothyroxine, 100 mcg = 1 tab(s), Oral, Daily, # 90 tab(s), 2 Refill(s), Pharmacy: [id number]  1160F- Medication reconciliation completed during visit, Diabetes mellitus, type 2  HTN - Hypertension  Hypothyroidism  Gastritis, [facility], 07/23/19 8:57:00 CDT  Clinic Follow up, *Est. 01/23/20 9:00:00 CST, Order for future visit, Chronic rhinitis, [facility]  Free T4, Routine collect, *Est. 01/23/20 3:00:00 CST, Blood, Order for future visit, *Est. Stop date 01/23/20 3:00:00 CST, Lab Collect, Hypothyroidism, 07/23/19 8:59:00 CDT  Free T4, Routine collect, 07/23/19 9:32:00 CDT, Blood, Lab Collect, Venous Draw, Print Label By Order Location  Office/Outpatient Visit Level 4 Established [id number] PC, Diabetes mellitus, type 2  HTN - Hypertension  Hypothyroidism  Gastritis, [facility], 07/23/19 8:56:00 CDT  Thyroid Stimulating Hormone, Routine collect, 07/23/19 9:32:00 CDT, Blood, Lab Collect, Venous Draw, Print Label By Order Location  Thyroid Stimulating Hormone, Routine collect, *Est. 01/23/20 3:00:00 CST, Blood, Order for future visit, *Est. Stop date 01/23/20 3:00:00  CST, Lab Collect, Hypothyroidism, 07/23/19 8:59:00 CDT  ?  7.?Shortness of breath?R06.02  ?Chronic, with recent worsening due to environmental exposures.? PFTs ordered today to clarify asthma versus COPD.? She is using Ventolin now and has plenty of refills.  ?  8.?Anemia?D64.9  H&H 11. & 35.9, with MCV 84.7 on 5/12/19.?? Hgb consistently in 11 range.? Plts and WBCs have been normal.? Will continue to molnitor.  ?  9.?Sleep apnea in adult?G47.30  Communication to sleep clinic to see if there is anything that can be done to help with CPAP supplies.? Instructed patient that her morning headaches are most likely due to untreated sleep apnea.  ?  10.?Knee osteoarthritis?M17.0  Followed by [facility].?  Conservative measures.? Knee pain and ROM improved with knee injections.? F/U ortho clinic, as needed and scheduled.  ?  11.?BMI 40.0-44.9, adult?Z68.41  ?Teaching provided on long-term complications associated with obesity, weight loss measures, increasing physical activity, improving diet, avoiding sugary foods and liquids, avoiding processed and fast foods, and increasing vegetables and fruits.? Instructed on importance of incorporating 30 minutes/day or 150 minutes/week for weight loss and management of chronic diseases.?  ?  Orders:  cetirizine, 10 mg = 1 tab(s), Oral, Daily, # 30 tab(s), 5 Refill(s), Pharmacy: [id number]  montelukast, 10 mg = 1 tab(s), Oral, Daily, # 30 tab(s), 6 Refill(s), Pharmacy: [facility] #[id number]  Complete Pulmonary Function Test, *Est. 08/23/19 3:00:00 CDT, Patient is using an inhaler and does not know why. Says someone told her she had asthma in past; but not certain. Non-smoker; however, heavy exposure to lung irritants., Order for future visit, Asthma, [facility]...  MG Screening, Routine, *Est. 08/23/19 3:00:00 CDT, Screening, None, Ambulatory, Rad Type, Order for future visit, Breast cancer screening, Schedule this test, [facility],  Follow Breast Imaging Order Set Protocol, *Est. 08/23/19 3:00:00 CDT  Occult Blood Fecal Immunoassay, Routine collect, Stool, Order for future visit, *Est. 08/06/19 3:00:00 CDT, *Est. Stop date 08/06/19 3:00:00 CDT, Nurse collect, Colon cancer screening  Referrals  Clinic Follow up, *Est. 01/23/20 9:00:00 CST, Order for future visit, Chronic rhinitis, [facility]   Problem List/Past Medical History  Ongoing  Anemia  BMI 40.0-44.9, adult  Chest pain in adult  Diabetes mellitus, type 2  Gastritis  HTN - Hypertension  Hypothyroidism  Knee osteoarthritis  Mixed hyperlipidemia  Obesity  Shortness of breath  Sleep apnea in adult  Historical  Adult BMI 39.0-39.9 kg/sq m  Diabetes  Kidney stone  Procedure/Surgical History  Biopsy Gastrointestinal (04/19/2018)  Colonoscopy (04/19/2018)  Colonoscopy, flexible; with ablation of tumor(s), polyp(s), or other lesion(s) (includes pre- and post-dilation and guide wire passage, when performed) (04/19/2018)  Destruction of Rectum, Via Natural or Artificial Opening Endoscopic (04/19/2018)  Esophagogastroduodenoscopy (04/19/2018)  Esophagogastroduodenoscopy, flexible, transoral; with biopsy, single or multiple (04/19/2018)  Excision of Duodenum, Via Natural or Artificial Opening Endoscopic, Diagnostic (04/19/2018)  Excision of Stomach, Pylorus, Via Natural or Artificial Opening Endoscopic, Diagnostic (04/19/2018)  TOTAL THYROIDECTOMY (2014)  HYSTERECTOMY (1991)  UMBILICAL HERNIA REPAIR (1986)  CSECTION (1977)  CSECTION (1971)   Medications  albuterol 90 mcg/inh inhalation aerosol, 2 puff(s), INH, QID, 5 refills  amLODIPine 10 mg oral tablet, 10 mg= 1 tab(s), Oral, Daily,? ?Not taking: duplicate  amLODIPine 10 mg oral tablet, 10 mg, Oral, Daily, 2 refills  APAP/butalbital/caffeine 325 mg-50 mg-40 mg oral tablet, 1 tab(s), Oral, q4hr, PRN,? ?Not taking  cetirizine 10 mg oral tablet, 10 mg= 1 tab(s), Oral, Daily, 5 refills  Co Q-10, 100 mg, Oral, Daily  levothyroxine 100 mcg (0.1 mg)  oral tablet, 100 mcg= 1 tab(s), Oral, Daily, 2 refills  Pantoprazole 40 mg ORAL EC-Tablet, 40 mg= 1 tab(s), Oral, Daily, 2 refills  pravastatin 20 mg oral tablet, 20 mg= 1 tab(s), Oral, Once a day (at bedtime), 2 refills  Singulair 10 mg oral TABLET, 10 mg= 1 tab(s), Oral, Daily, 6 refills  Vitamin D3  Zantac 150 oral tablet, 150 mg= 1 tab(s), Oral, BID, 5 refills  Allergies  Accupril  Bactrim  Social History  Abuse/Neglect  No, 2019  Alcohol - Denies Alcohol Use, 2016  1-2 times per year, 2019  Employment/School  Unemployed, Work/School description: DISABLE., 2016  Exercise  Exercise frequency: Daily. Exercise type: stretches and deep breathing., 2019  Home/Environment  Lives with Alone. Feels unsafe at home: No. Safe place to go: Yes. Family/Friends available for support: Yes., 2016  Nutrition/Health  Regular, Good, Diet restrictions: lots of fish and vegatables., 2019  Other  Sexual  Sexual orientation: Straight or heterosexual. Gender Identity Identifies as female., 2019  Substance Use - Denies Substance Abuse, 2016  Current, Marijuana, 1-2 times per month, 2018  Tobacco - Denies Tobacco Use, 2016  Never (less than 100 in lifetime), N/A, 2019  Family History  Cardiac arrest.: Mother.  Congestive heart disease.: Father.  : Mother and Father.  Heart disease: Father.  Hyperthyroidism.: Negative: Sister.  Hypothyroidism.: Negative: Sister.  Immunizations  Vaccine Date Status   tetanus/diphtheria/pertussis, acel(Tdap) 2019 Given   Health Maintenance  Health Maintenance  ???Pending?(in the next year)  ??? ??OverDue  ??? ? ? ?Coronary Artery Disease Maintenance-Lipid Lowering Therapy due??and every?  ??? ? ? ?Advance Directive due??19??and every 1??year(s)  ??? ? ? ?Diabetes Maintenance-Medication Prescribed due??01/15/19??and every 1??year(s)  ??? ??Due?  ??? ? ? ?Breast Cancer Screening ([facility]) due??19??and  every?  ??? ? ? ?Coronary Artery Disease Maintenance-Antiplatelet Agent Prescribed due??07/23/19??and every?  ??? ? ? ?Hypertension Management-Education due??07/23/19??and every 1??year(s)  ??? ? ? ?Pneumococcal Vaccine due??07/23/19??Variable frequency  ??? ? ? ?Zoster Vaccine due??07/23/19??and every 100??year(s)  ??? ??Due In Future?  ??? ? ? ?Diabetes Maintenance-Urine Dipstick not due until??12/14/19??and every 1??year(s)  ??? ? ? ?Alcohol Misuse Screening not due until??01/01/20??and every 1??year(s)  ??? ? ? ?Cognitive Screening not due until??01/01/20??and every 1??year(s)  ??? ? ? ?Fall Risk Assessment not due until??01/01/20??and every 1??year(s)  ??? ? ? ?Functional Assessment not due until??01/01/20??and every 1??year(s)  ??? ? ? ?Geriatric Depression Screening not due until??01/01/20??and every 1??year(s)  ??? ? ? ?Obesity Screening not due until??01/01/20??and every 1??year(s)  ??? ? ? ?Bone Density Screening not due until??02/01/20??and every 2??year(s)  ??? ? ? ?Diabetes Maintenance-Eye Exam not due until??03/07/20??and every 1??year(s)  ??? ? ? ?Diabetes Maintenance-Foot Exam not due until??03/07/20??and every 1??year(s)  ??? ? ? ?Aspirin Therapy for CVD Prevention not due until??05/12/20??and every 1??year(s)  ??? ? ? ?ADL Screening not due until??06/07/20??and every 1??year(s)  ??? ? ? ?Diabetes Maintenance-Fasting Lipid Profile not due until??07/17/20??and every 1??year(s)  ??? ? ? ?Diabetes Maintenance-HgbA1c not due until??07/17/20??and every 1??year(s)  ??? ? ? ?Hypertension Management-BMP not due until??07/17/20??and every 1??year(s)  ??? ? ? ?Diabetes Maintenance-Microalbumin not due until??07/18/20??and every 1??year(s)  ??? ? ? ?Diabetes Maintenance-Serum Creatinine not due until??07/18/20??and every 1??year(s)  ??? ? ? ?Hypertension Management-Blood Pressure not due until??07/22/20??and every 1??year(s)  ???Satisfied?(in the past 1 year)  ??? ??Satisfied?  ??? ? ? ?ADL Screening  on??06/07/19.??Satisfied by Majo Schroeder  ??? ? ? ?Advance Directive on??08/22/18.??Satisfied by Christine Herbert LPN  ??? ? ? ?Alcohol Misuse Screening on??06/07/19.??Satisfied by Majo Schroeder  ??? ? ? ?Aspirin Therapy for CVD Prevention on??05/12/19.??Satisfied by Edmund Arias  ??? ? ? ?Blood Pressure Screening on??07/23/19.??Satisfied by Pauline Coffey LPN  ??? ? ? ?Body Mass Index Check on??07/23/19.??Satisfied by Pauline Coffey LPN  ??? ? ? ?Cognitive Screening on??07/23/19.??Satisfied by Pauline Coffey LPN  ??? ? ? ?Coronary Artery Disease Maintenance-Lipid Lowering Therapy on??07/23/19.??Satisfied by Shabana Ca  ??? ? ? ?Depression Screening on??07/23/19.??Satisfied by Pauline Coffey LPN  ??? ? ? ?Diabetes Maintenance-Serum Creatinine on??07/18/19.??Satisfied by Jess Madden  ??? ? ? ?Diabetes Screening on??07/18/19.??Satisfied by Jess Madden  ??? ? ? ?Fall Risk Assessment on??07/23/19.??Satisfied by Pauline Coffey LPN  ??? ? ? ?Functional Assessment on??03/08/19.??Satisfied by Gini Scott LPN.  ??? ? ? ?Geriatric Depression Screening on??07/23/19.??Satisfied by Pauline Coffey LPN  ??? ? ? ?Hypertension Management-Blood Pressure on??07/23/19.??Satisfied by Pauline Coffey LPN  ??? ? ? ?Influenza Vaccine on??11/07/18.??Satisfied by Gini Scott LPN F.  ??? ? ? ?Lipid Screening on??07/18/19.??Satisfied by Jess Madden  ??? ? ? ?Obesity Screening on??07/23/19.??Satisfied by Pauline Coffey LPN  ??? ? ? ?Tetanus Vaccine on??03/08/19.??Satisfied by Gini Scott LPN  ?